# Patient Record
Sex: MALE | Race: WHITE | NOT HISPANIC OR LATINO | Employment: PART TIME | URBAN - NONMETROPOLITAN AREA
[De-identification: names, ages, dates, MRNs, and addresses within clinical notes are randomized per-mention and may not be internally consistent; named-entity substitution may affect disease eponyms.]

---

## 2017-07-14 ENCOUNTER — OFFICE VISIT (OUTPATIENT)
Dept: UROLOGY | Facility: CLINIC | Age: 32
End: 2017-07-14

## 2017-07-14 VITALS — BODY MASS INDEX: 31.99 KG/M2 | TEMPERATURE: 97.5 F | WEIGHT: 216 LBS | HEIGHT: 69 IN

## 2017-07-14 DIAGNOSIS — Z30.09 VISIT FOR VASECTOMY EVALUATION: Primary | ICD-10-CM

## 2017-07-14 PROCEDURE — 99203 OFFICE O/P NEW LOW 30 MIN: CPT | Performed by: UROLOGY

## 2017-07-14 RX ORDER — ALPRAZOLAM 2 MG/1
2 TABLET ORAL AS NEEDED
Qty: 1 TABLET | Refills: 0 | Status: SHIPPED | OUTPATIENT
Start: 2017-07-14 | End: 2022-08-09

## 2017-07-14 RX ORDER — HYDROCODONE BITARTRATE AND ACETAMINOPHEN 7.5; 325 MG/1; MG/1
1 TABLET ORAL EVERY 4 HOURS PRN
Qty: 16 TABLET | Refills: 0 | Status: SHIPPED | OUTPATIENT
Start: 2017-07-14 | End: 2022-02-17

## 2017-07-14 NOTE — PROGRESS NOTES
Mr. Epps is 32 y.o. male    CHIEF COMPLAINT: I am here to discuss a vasectomy     HPI  The patient has been pondering the option of a vasectomy for>5 months. With regard to context of the decision, he presently has 3 children. He is . Associated/Relevant symptoms/signs include None. He voices no additional questions about birth control options.       The following portions of the patient's history were reviewed and updated as appropriate: allergies, current medications, past family history, past medical history, past social history, past surgical history and problem list.    Review of Systems   Constitutional: Negative for appetite change and fever.   HENT: Negative for hearing loss and sore throat.    Eyes: Negative for pain and redness.   Respiratory: Negative for cough and shortness of breath.    Cardiovascular: Negative for chest pain and leg swelling.   Gastrointestinal: Negative for anal bleeding, nausea and vomiting.   Endocrine: Negative for cold intolerance and heat intolerance.   Genitourinary: Negative for dysuria, flank pain and hematuria.   Musculoskeletal: Negative for joint swelling and myalgias.   Skin: Negative for color change and rash.   Allergic/Immunologic: Negative for immunocompromised state.   Neurological: Negative for dizziness and speech difficulty.   Hematological: Negative for adenopathy. Does not bruise/bleed easily.   Psychiatric/Behavioral: Negative for dysphoric mood and suicidal ideas.         Current Outpatient Prescriptions:   •  ALPRAZolam (XANAX) 2 MG tablet, Take 1 tablet by mouth As Needed for Anxiety for up to 1 dose., Disp: 1 tablet, Rfl: 0  •  HYDROcodone-acetaminophen (NORCO) 7.5-325 MG per tablet, Take 1 tablet by mouth Every 4 (Four) Hours As Needed for Moderate Pain  or Severe Pain  for up to 16 doses., Disp: 16 tablet, Rfl: 0    History reviewed. No pertinent past medical history.    History reviewed. No pertinent surgical history.    Social History  "    Social History   • Marital status: Unknown     Spouse name: N/A   • Number of children: N/A   • Years of education: N/A     Social History Main Topics   • Smoking status: Never Smoker   • Smokeless tobacco: None   • Alcohol use None   • Drug use: None   • Sexual activity: Not Asked     Other Topics Concern   • None     Social History Narrative   • None       Family History   Problem Relation Age of Onset   • No Known Problems Father    • No Known Problems Mother        Temp 97.5 °F (36.4 °C)  Ht 69\" (175.3 cm)  Wt 216 lb (98 kg)  BMI 31.9 kg/m2    Physical Exam   Constitutional: He is oriented to person, place, and time. He appears well-developed and well-nourished.   HENT:   Head: Normocephalic and atraumatic.   Eyes: Conjunctivae are normal.   Neck: Normal range of motion. Neck supple.   Cardiovascular: Normal rate and regular rhythm.    Pulmonary/Chest: Effort normal. No respiratory distress. He exhibits no tenderness.   Abdominal: Soft. He exhibits no distension. There is no tenderness. Hernia confirmed negative in the right inguinal area and confirmed negative in the left inguinal area.   Genitourinary: Testes normal and penis normal. Right testis shows no mass, no swelling and no tenderness. Left testis shows no mass, no swelling and no tenderness. No phimosis, paraphimosis or penile tenderness.   Genitourinary Comments: The epididymis is palpably normal bilaterally without mass.  The vas deferens is also palpable bilaterally in the usual location and appears accessible for vasectomy.   Musculoskeletal: Normal range of motion. He exhibits no edema or deformity.   Neurological: He is alert and oriented to person, place, and time.   Skin: Skin is warm and dry.   Psychiatric: He has a normal mood and affect. His behavior is normal.   Vitals reviewed.        No results found for this or any previous visit.    Imaging Results (last 7 days)     ** No results found for the last 168 hours. **    "       Assessment and Plan  Diagnoses and all orders for this visit:    Visit for vasectomy evaluation  -     ALPRAZolam (XANAX) 2 MG tablet; Take 1 tablet by mouth As Needed for Anxiety for up to 1 dose.  -     HYDROcodone-acetaminophen (NORCO) 7.5-325 MG per tablet; Take 1 tablet by mouth Every 4 (Four) Hours As Needed for Moderate Pain  or Severe Pain  for up to 16 doses.  -     Vasectomy; Future    The patient desires vasectomy.  Risks of this procedure are discussed.  We discussed that it does take up to 6 months for a patient to clear the proximal sperm through the process of ejaculation.  It is explained that postoperative specimens are essential before consideration of birth control cessation.  Risks of bleeding, infection, sperm granuloma, testicular pain that can become prolonged such as post vasectomy neuralgia, recanalization with resumption of fertility status, testicular atrophy are included in the discussion.  The patient is made aware of other birth control options including permanent sterilization procedures for females.  It is also explained the vasectomy does not reduce the risk of sexually transmitted disease.  Discussion of the use of preprocedural benzodiazepine and postoperative opiate narcotic relief is also undertaken.  Brief addiction assessment concluded.  The patient has consented to the procedure.      Bo Lara MD  07/17/17  7:13 AM      Cc: Dr. Alexandre

## 2017-08-03 ENCOUNTER — TELEPHONE (OUTPATIENT)
Dept: UROLOGY | Facility: CLINIC | Age: 32
End: 2017-08-03

## 2017-08-03 NOTE — TELEPHONE ENCOUNTER
PT CALLED BACK I SPOKE WITH HIM ABOUT APT. I REMINDED HIM TO SHAVE, BRING  & MEDS. PT VERBALIZED UNDERSTANDING.

## 2017-08-04 ENCOUNTER — OFFICE VISIT (OUTPATIENT)
Dept: UROLOGY | Facility: CLINIC | Age: 32
End: 2017-08-04

## 2017-08-04 DIAGNOSIS — Z30.2 ENCOUNTER FOR VASECTOMY: Primary | ICD-10-CM

## 2017-08-04 PROCEDURE — 55250 REMOVAL OF SPERM DUCT(S): CPT | Performed by: UROLOGY

## 2017-08-04 NOTE — PATIENT INSTRUCTIONS
Following vasectomy you should bring in 2 specimens to confirm that there is no evidence of sperm before stopping birth control.  I would recommend that you bring these in at 8 weeks and then another at 12 weeks.  Sexual activity without birth control can lead to pregnancy following vasectomy until confirmation has been made my semen analysis.

## 2018-06-26 DIAGNOSIS — Z30.2 ENCOUNTER FOR VASECTOMY: Primary | ICD-10-CM

## 2018-06-29 ENCOUNTER — TELEPHONE (OUTPATIENT)
Dept: UROLOGY | Facility: CLINIC | Age: 33
End: 2018-06-29

## 2018-06-29 ENCOUNTER — LAB (OUTPATIENT)
Dept: LAB | Facility: HOSPITAL | Age: 33
End: 2018-06-29
Attending: UROLOGY

## 2018-06-29 DIAGNOSIS — Z30.2 ENCOUNTER FOR VASECTOMY: ICD-10-CM

## 2018-06-29 LAB — SPERM - POST VASECTOMY: NORMAL

## 2018-06-29 PROCEDURE — 89321 SEMEN ANAL SPERM DETECTION: CPT | Performed by: UROLOGY

## 2018-06-29 NOTE — TELEPHONE ENCOUNTER
Patient called for results of semen analysis. I read the report to him which states no semen found in specimen. Instructed him to bring in one more sample in approx 2-3 weeks. He voiced understanding.

## 2019-11-19 ENCOUNTER — HOSPITAL ENCOUNTER (OUTPATIENT)
Age: 34
Setting detail: OUTPATIENT SURGERY
Discharge: HOME | End: 2019-11-19
Attending: ORTHOPAEDIC SURGERY | Admitting: ORTHOPAEDIC SURGERY
Payer: OTHER GOVERNMENT

## 2019-11-19 ENCOUNTER — ANESTHESIA (OUTPATIENT)
Dept: OPERATING ROOM | Age: 34
End: 2019-11-19
Payer: OTHER GOVERNMENT

## 2019-11-19 ENCOUNTER — ANESTHESIA EVENT (OUTPATIENT)
Dept: OPERATING ROOM | Age: 34
End: 2019-11-19
Payer: OTHER GOVERNMENT

## 2019-11-19 VITALS
TEMPERATURE: 97.4 F | RESPIRATION RATE: 18 BRPM | DIASTOLIC BLOOD PRESSURE: 73 MMHG | HEART RATE: 59 BPM | WEIGHT: 195 LBS | SYSTOLIC BLOOD PRESSURE: 114 MMHG | BODY MASS INDEX: 28.88 KG/M2 | HEIGHT: 69 IN | OXYGEN SATURATION: 97 %

## 2019-11-19 VITALS
DIASTOLIC BLOOD PRESSURE: 43 MMHG | TEMPERATURE: 97.8 F | SYSTOLIC BLOOD PRESSURE: 89 MMHG | RESPIRATION RATE: 18 BRPM | OXYGEN SATURATION: 97 %

## 2019-11-19 DIAGNOSIS — S61.209D EXTENSOR TENDON LACERATION OF FINGER WITH OPEN WOUND, SUBSEQUENT ENCOUNTER: Primary | ICD-10-CM

## 2019-11-19 DIAGNOSIS — S56.429D EXTENSOR TENDON LACERATION OF FINGER WITH OPEN WOUND, SUBSEQUENT ENCOUNTER: Primary | ICD-10-CM

## 2019-11-19 PROCEDURE — 3700000001 HC ADD 15 MINUTES (ANESTHESIA): Performed by: ORTHOPAEDIC SURGERY

## 2019-11-19 PROCEDURE — 6360000002 HC RX W HCPCS: Performed by: ANESTHESIOLOGY

## 2019-11-19 PROCEDURE — 7100000001 HC PACU RECOVERY - ADDTL 15 MIN: Performed by: ORTHOPAEDIC SURGERY

## 2019-11-19 PROCEDURE — 3600000002 HC SURGERY LEVEL 2 BASE: Performed by: ORTHOPAEDIC SURGERY

## 2019-11-19 PROCEDURE — 6360000002 HC RX W HCPCS

## 2019-11-19 PROCEDURE — 7100000000 HC PACU RECOVERY - FIRST 15 MIN: Performed by: ORTHOPAEDIC SURGERY

## 2019-11-19 PROCEDURE — 3600000012 HC SURGERY LEVEL 2 ADDTL 15MIN: Performed by: ORTHOPAEDIC SURGERY

## 2019-11-19 PROCEDURE — 2580000003 HC RX 258: Performed by: ORTHOPAEDIC SURGERY

## 2019-11-19 PROCEDURE — 6360000002 HC RX W HCPCS: Performed by: ORTHOPAEDIC SURGERY

## 2019-11-19 PROCEDURE — 7100000010 HC PHASE II RECOVERY - FIRST 15 MIN: Performed by: ORTHOPAEDIC SURGERY

## 2019-11-19 PROCEDURE — 6370000000 HC RX 637 (ALT 250 FOR IP): Performed by: ANESTHESIOLOGY

## 2019-11-19 PROCEDURE — 3700000000 HC ANESTHESIA ATTENDED CARE: Performed by: ORTHOPAEDIC SURGERY

## 2019-11-19 PROCEDURE — 2709999900 HC NON-CHARGEABLE SUPPLY: Performed by: ORTHOPAEDIC SURGERY

## 2019-11-19 PROCEDURE — 2500000003 HC RX 250 WO HCPCS

## 2019-11-19 PROCEDURE — 76942 ECHO GUIDE FOR BIOPSY: CPT

## 2019-11-19 RX ORDER — SODIUM CHLORIDE 0.9 % (FLUSH) 0.9 %
10 SYRINGE (ML) INJECTION EVERY 12 HOURS SCHEDULED
Status: DISCONTINUED | OUTPATIENT
Start: 2019-11-19 | End: 2019-11-19 | Stop reason: HOSPADM

## 2019-11-19 RX ORDER — ENALAPRILAT 2.5 MG/2ML
1.25 INJECTION INTRAVENOUS
Status: DISCONTINUED | OUTPATIENT
Start: 2019-11-19 | End: 2019-11-19 | Stop reason: HOSPADM

## 2019-11-19 RX ORDER — FENTANYL CITRATE 50 UG/ML
50 INJECTION, SOLUTION INTRAMUSCULAR; INTRAVENOUS
Status: DISCONTINUED | OUTPATIENT
Start: 2019-11-19 | End: 2019-11-19 | Stop reason: HOSPADM

## 2019-11-19 RX ORDER — LIDOCAINE HYDROCHLORIDE 10 MG/ML
INJECTION, SOLUTION EPIDURAL; INFILTRATION; INTRACAUDAL; PERINEURAL PRN
Status: DISCONTINUED | OUTPATIENT
Start: 2019-11-19 | End: 2019-11-19 | Stop reason: SDUPTHER

## 2019-11-19 RX ORDER — FENTANYL CITRATE 50 UG/ML
25 INJECTION, SOLUTION INTRAMUSCULAR; INTRAVENOUS
Status: DISCONTINUED | OUTPATIENT
Start: 2019-11-19 | End: 2019-11-19 | Stop reason: HOSPADM

## 2019-11-19 RX ORDER — PROMETHAZINE HYDROCHLORIDE 25 MG/ML
6.25 INJECTION, SOLUTION INTRAMUSCULAR; INTRAVENOUS
Status: DISCONTINUED | OUTPATIENT
Start: 2019-11-19 | End: 2019-11-19 | Stop reason: HOSPADM

## 2019-11-19 RX ORDER — HYDRALAZINE HYDROCHLORIDE 20 MG/ML
5 INJECTION INTRAMUSCULAR; INTRAVENOUS EVERY 10 MIN PRN
Status: DISCONTINUED | OUTPATIENT
Start: 2019-11-19 | End: 2019-11-19 | Stop reason: HOSPADM

## 2019-11-19 RX ORDER — ROPIVACAINE HYDROCHLORIDE 5 MG/ML
INJECTION, SOLUTION EPIDURAL; INFILTRATION; PERINEURAL PRN
Status: DISCONTINUED | OUTPATIENT
Start: 2019-11-19 | End: 2019-11-19 | Stop reason: SDUPTHER

## 2019-11-19 RX ORDER — MIDAZOLAM HYDROCHLORIDE 1 MG/ML
INJECTION INTRAMUSCULAR; INTRAVENOUS PRN
Status: DISCONTINUED | OUTPATIENT
Start: 2019-11-19 | End: 2019-11-19 | Stop reason: SDUPTHER

## 2019-11-19 RX ORDER — SODIUM CHLORIDE, SODIUM LACTATE, POTASSIUM CHLORIDE, CALCIUM CHLORIDE 600; 310; 30; 20 MG/100ML; MG/100ML; MG/100ML; MG/100ML
INJECTION, SOLUTION INTRAVENOUS CONTINUOUS
Status: DISCONTINUED | OUTPATIENT
Start: 2019-11-19 | End: 2019-11-19 | Stop reason: HOSPADM

## 2019-11-19 RX ORDER — SODIUM CHLORIDE 0.9 % (FLUSH) 0.9 %
10 SYRINGE (ML) INJECTION PRN
Status: DISCONTINUED | OUTPATIENT
Start: 2019-11-19 | End: 2019-11-19 | Stop reason: HOSPADM

## 2019-11-19 RX ORDER — OXYCODONE HYDROCHLORIDE AND ACETAMINOPHEN 5; 325 MG/1; MG/1
1 TABLET ORAL EVERY 6 HOURS PRN
Qty: 10 TABLET | Refills: 0 | Status: SHIPPED | OUTPATIENT
Start: 2019-11-19 | End: 2019-11-22

## 2019-11-19 RX ORDER — LABETALOL 20 MG/4 ML (5 MG/ML) INTRAVENOUS SYRINGE
5 EVERY 10 MIN PRN
Status: DISCONTINUED | OUTPATIENT
Start: 2019-11-19 | End: 2019-11-19 | Stop reason: HOSPADM

## 2019-11-19 RX ORDER — LIDOCAINE HYDROCHLORIDE 10 MG/ML
1 INJECTION, SOLUTION EPIDURAL; INFILTRATION; INTRACAUDAL; PERINEURAL
Status: DISCONTINUED | OUTPATIENT
Start: 2019-11-19 | End: 2019-11-19 | Stop reason: HOSPADM

## 2019-11-19 RX ORDER — ONDANSETRON 2 MG/ML
INJECTION INTRAMUSCULAR; INTRAVENOUS PRN
Status: DISCONTINUED | OUTPATIENT
Start: 2019-11-19 | End: 2019-11-19 | Stop reason: SDUPTHER

## 2019-11-19 RX ORDER — KETOROLAC TROMETHAMINE 30 MG/ML
INJECTION, SOLUTION INTRAMUSCULAR; INTRAVENOUS PRN
Status: DISCONTINUED | OUTPATIENT
Start: 2019-11-19 | End: 2019-11-19 | Stop reason: SDUPTHER

## 2019-11-19 RX ORDER — DIPHENHYDRAMINE HYDROCHLORIDE 50 MG/ML
12.5 INJECTION INTRAMUSCULAR; INTRAVENOUS
Status: DISCONTINUED | OUTPATIENT
Start: 2019-11-19 | End: 2019-11-19 | Stop reason: HOSPADM

## 2019-11-19 RX ORDER — MORPHINE SULFATE 4 MG/ML
4 INJECTION, SOLUTION INTRAMUSCULAR; INTRAVENOUS EVERY 5 MIN PRN
Status: DISCONTINUED | OUTPATIENT
Start: 2019-11-19 | End: 2019-11-19 | Stop reason: HOSPADM

## 2019-11-19 RX ORDER — MIDAZOLAM HYDROCHLORIDE 1 MG/ML
2 INJECTION INTRAMUSCULAR; INTRAVENOUS
Status: COMPLETED | OUTPATIENT
Start: 2019-11-19 | End: 2019-11-19

## 2019-11-19 RX ORDER — FENTANYL CITRATE 50 UG/ML
INJECTION, SOLUTION INTRAMUSCULAR; INTRAVENOUS PRN
Status: DISCONTINUED | OUTPATIENT
Start: 2019-11-19 | End: 2019-11-19 | Stop reason: SDUPTHER

## 2019-11-19 RX ORDER — MORPHINE SULFATE 4 MG/ML
2 INJECTION, SOLUTION INTRAMUSCULAR; INTRAVENOUS EVERY 5 MIN PRN
Status: DISCONTINUED | OUTPATIENT
Start: 2019-11-19 | End: 2019-11-19 | Stop reason: HOSPADM

## 2019-11-19 RX ORDER — SCOLOPAMINE TRANSDERMAL SYSTEM 1 MG/1
1 PATCH, EXTENDED RELEASE TRANSDERMAL
Status: DISCONTINUED | OUTPATIENT
Start: 2019-11-19 | End: 2019-11-19 | Stop reason: HOSPADM

## 2019-11-19 RX ORDER — PROPOFOL 10 MG/ML
INJECTION, EMULSION INTRAVENOUS PRN
Status: DISCONTINUED | OUTPATIENT
Start: 2019-11-19 | End: 2019-11-19 | Stop reason: SDUPTHER

## 2019-11-19 RX ORDER — MEPERIDINE HYDROCHLORIDE 50 MG/ML
12.5 INJECTION INTRAMUSCULAR; INTRAVENOUS; SUBCUTANEOUS EVERY 5 MIN PRN
Status: DISCONTINUED | OUTPATIENT
Start: 2019-11-19 | End: 2019-11-19 | Stop reason: HOSPADM

## 2019-11-19 RX ORDER — METOCLOPRAMIDE HYDROCHLORIDE 5 MG/ML
10 INJECTION INTRAMUSCULAR; INTRAVENOUS
Status: DISCONTINUED | OUTPATIENT
Start: 2019-11-19 | End: 2019-11-19 | Stop reason: HOSPADM

## 2019-11-19 RX ADMIN — KETOROLAC TROMETHAMINE 30 MG: 30 INJECTION, SOLUTION INTRAMUSCULAR; INTRAVENOUS at 17:00

## 2019-11-19 RX ADMIN — SODIUM CHLORIDE, SODIUM LACTATE, POTASSIUM CHLORIDE, AND CALCIUM CHLORIDE: 600; 310; 30; 20 INJECTION, SOLUTION INTRAVENOUS at 16:06

## 2019-11-19 RX ADMIN — FENTANYL CITRATE 50 MCG: 50 INJECTION INTRAMUSCULAR; INTRAVENOUS at 16:17

## 2019-11-19 RX ADMIN — MIDAZOLAM 2 MG: 1 INJECTION INTRAMUSCULAR; INTRAVENOUS at 16:06

## 2019-11-19 RX ADMIN — SODIUM CHLORIDE, SODIUM LACTATE, POTASSIUM CHLORIDE, AND CALCIUM CHLORIDE: 600; 310; 30; 20 INJECTION, SOLUTION INTRAVENOUS at 16:56

## 2019-11-19 RX ADMIN — MIDAZOLAM 2 MG: 1 INJECTION INTRAMUSCULAR; INTRAVENOUS at 15:47

## 2019-11-19 RX ADMIN — ROPIVACAINE HYDROCHLORIDE 25 ML: 5 INJECTION, SOLUTION EPIDURAL; INFILTRATION; PERINEURAL at 15:53

## 2019-11-19 RX ADMIN — FENTANYL CITRATE 50 MCG: 50 INJECTION INTRAMUSCULAR; INTRAVENOUS at 16:51

## 2019-11-19 RX ADMIN — ONDANSETRON HYDROCHLORIDE 4 MG: 2 INJECTION, SOLUTION INTRAMUSCULAR; INTRAVENOUS at 17:00

## 2019-11-19 RX ADMIN — PROPOFOL 60 MG: 10 INJECTION, EMULSION INTRAVENOUS at 16:12

## 2019-11-19 RX ADMIN — Medication 2 G: at 16:15

## 2019-11-19 RX ADMIN — LIDOCAINE HYDROCHLORIDE 50 MG: 10 INJECTION, SOLUTION EPIDURAL; INFILTRATION; INTRACAUDAL; PERINEURAL at 16:09

## 2019-11-19 RX ADMIN — PROPOFOL 200 MG: 10 INJECTION, EMULSION INTRAVENOUS at 16:09

## 2019-11-19 RX ADMIN — PROPOFOL 60 MG: 10 INJECTION, EMULSION INTRAVENOUS at 16:11

## 2019-11-19 ASSESSMENT — PAIN SCALES - GENERAL
PAINLEVEL_OUTOF10: 0

## 2019-11-19 ASSESSMENT — LIFESTYLE VARIABLES: SMOKING_STATUS: 0

## 2022-02-28 ENCOUNTER — APPOINTMENT (OUTPATIENT)
Dept: GENERAL RADIOLOGY | Facility: HOSPITAL | Age: 37
End: 2022-02-28

## 2022-02-28 PROCEDURE — 71046 X-RAY EXAM CHEST 2 VIEWS: CPT

## 2022-06-09 NOTE — PROGRESS NOTES
Chief Complaint  Shortness of Breath    Subjective    History of Present Illness     Shane Epps presents to [facility] for:    Management of shortness of breath, chest tightness and wheezing.  [facility]  He is a never smoker.  He reports a history of exercise-induced asthma as a young child.  He reports for the last few years he has had seasonal cough, chest tightness and congestion.  Typically around harvest season.  He typically responds well to taking daily Mucinex throughout the season.  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  He has chickens at home.  He has cats at home.  He is uncertain what exposure he may have had while serving in the .  He denies any family history of lung disease.  Diagnosed with COVID in January.  Since then he feels his symptoms have been a bit more difficult to manage. X-ray imaging in February showing mild interstitial changes.  He sought treatment at urgent care.  He was given Advair 500 and oral steroids.  He believes those were helpful but he is uncertain.  He has been off of those for quite some time.  He has been using an albuterol inhaler on a daily basis mostly for activity induced complaints.  He also reports using it in the evenings on days when he is completed yard work due to increased wheezing and tightness.       Prior to Admission medications    Medication Sig Start Date End Date Taking? Authorizing Provider   ALPRAZolam (XANAX) 2 MG tablet Take 1 tablet by mouth As Needed for Anxiety for up to 1 dose. 7/14/17   Bo Lara MD   amphetamine-dextroamphetamine (ADDERALL) 15 MG tablet  5/3/22   ProviderEdilberto MD   amphetamine-dextroamphetamine XR (ADDERALL XR) 30 MG 24 hr capsule  5/3/22   ProviderEdilberto MD   diazePAM (VALIUM) 2 MG tablet  2/1/22   Emergency, Nurse Yanique, RN   fluticasone-salmeterol (ADVAIR) 500-50 MCG/DOSE DISKUS  "Inhale 1 puff 2 (Two) Times a Day. 2/28/22   Portia Waite APRN   Glucosamine-Chondroit-Vit C-Mn (Glucosamine Chondr 1500 Complx) capsule Take 1 tablet by mouth Daily.    Emergency, Nurse Yanique, RN       Social History     Socioeconomic History   • Marital status:    Tobacco Use   • Smoking status: Never Smoker   Vaping Use   • Vaping Use: Never used   Substance and Sexual Activity   • Alcohol use: Never   • Drug use: Never   • Sexual activity: Yes       Objective   Vital Signs:   /80   Pulse 87   Ht 175.3 cm (69\")   Wt 98.9 kg (218 lb)   SpO2 98% Comment: ra  BMI 32.19 kg/m²     Physical Exam  Constitutional:       Appearance: He is overweight.      Interventions: Face mask in place.   Cardiovascular:      Rate and Rhythm: Normal rate and regular rhythm.      Heart sounds: No murmur heard.  Pulmonary:      Effort: Pulmonary effort is normal.      Breath sounds: Normal breath sounds.   Musculoskeletal:      Right lower leg: No edema.      Left lower leg: No edema.   Neurological:      Mental Status: He is alert and oriented to person, place, and time.        Result Review :      My interpretation of the PFT: none for this visit    No results found for this or any previous visit.    XR Chest 2 View (02/28/2022 11:41)    My interpretation of imaging:  Mild interstitial changes    My interpretation of labs: none for this visit      Assessment and Plan     Diagnoses and all orders for this visit:    1. Shortness of breath (Primary)  Comments:  Trial of Dulera 200 x 1 month.  Continue to use albuterol as needed or prior to exercise.  PFTs when he returns    2. Class 1 obesity due to excess calories without serious comorbidity with body mass index (BMI) of 32.0 to 32.9 in adult  Comments:  Education material provided after visit summary about BMI    3. Mild persistent asthma without complication  Comments:  History of exercise-induced asthma likely triggered by allergens related to outdoor work and " farming.  Also triggered by viral illness, COVID      If asthma is refractory to inhaler therapy consider adding Singulair versus allergy work-up.    Body mass index is 32.19 kg/m².    Tianna Coulter, APRN  6/22/2022  10:08 CDT    Follow Up   Return in about 4 weeks (around 7/20/2022) for FVL with diffusion.    Patient was given instructions and counseling regarding his condition or for health maintenance advice. Please see specific information pulled into the AVS if appropriate.

## 2022-06-22 ENCOUNTER — OFFICE VISIT (OUTPATIENT)
Dept: PULMONOLOGY | Facility: CLINIC | Age: 37
End: 2022-06-22

## 2022-06-22 VITALS
OXYGEN SATURATION: 98 % | BODY MASS INDEX: 32.29 KG/M2 | SYSTOLIC BLOOD PRESSURE: 132 MMHG | HEIGHT: 69 IN | DIASTOLIC BLOOD PRESSURE: 80 MMHG | WEIGHT: 218 LBS | HEART RATE: 87 BPM

## 2022-06-22 DIAGNOSIS — R06.02 SHORTNESS OF BREATH: Primary | Chronic | ICD-10-CM

## 2022-06-22 DIAGNOSIS — J45.30 MILD PERSISTENT ASTHMA WITHOUT COMPLICATION: Chronic | ICD-10-CM

## 2022-06-22 DIAGNOSIS — E66.09 CLASS 1 OBESITY DUE TO EXCESS CALORIES WITHOUT SERIOUS COMORBIDITY WITH BODY MASS INDEX (BMI) OF 32.0 TO 32.9 IN ADULT: ICD-10-CM

## 2022-06-22 PROBLEM — E66.811 CLASS 1 OBESITY DUE TO EXCESS CALORIES IN ADULT: Chronic | Status: ACTIVE | Noted: 2022-06-22

## 2022-06-22 PROBLEM — E66.811 CLASS 1 OBESITY DUE TO EXCESS CALORIES IN ADULT: Status: ACTIVE | Noted: 2022-06-22

## 2022-06-22 PROCEDURE — 99214 OFFICE O/P EST MOD 30 MIN: CPT | Performed by: NURSE PRACTITIONER

## 2022-06-27 PROBLEM — J45.20 MILD INTERMITTENT ASTHMA WITHOUT COMPLICATION: Chronic | Status: ACTIVE | Noted: 2022-06-27

## 2022-06-27 PROBLEM — E66.811 CLASS 1 OBESITY DUE TO EXCESS CALORIES WITHOUT SERIOUS COMORBIDITY WITH BODY MASS INDEX (BMI) OF 30.0 TO 30.9 IN ADULT: Status: ACTIVE | Noted: 2022-06-27

## 2022-06-27 PROBLEM — J45.20 MILD INTERMITTENT ASTHMA WITHOUT COMPLICATION: Status: ACTIVE | Noted: 2022-06-27

## 2022-06-27 PROBLEM — E66.811 CLASS 1 OBESITY DUE TO EXCESS CALORIES WITHOUT SERIOUS COMORBIDITY WITH BODY MASS INDEX (BMI) OF 30.0 TO 30.9 IN ADULT: Status: RESOLVED | Noted: 2022-06-27 | Resolved: 2022-06-27

## 2022-06-27 PROBLEM — E66.09 CLASS 1 OBESITY DUE TO EXCESS CALORIES WITHOUT SERIOUS COMORBIDITY WITH BODY MASS INDEX (BMI) OF 30.0 TO 30.9 IN ADULT: Status: RESOLVED | Noted: 2022-06-27 | Resolved: 2022-06-27

## 2022-06-27 PROBLEM — E66.09 CLASS 1 OBESITY DUE TO EXCESS CALORIES WITHOUT SERIOUS COMORBIDITY WITH BODY MASS INDEX (BMI) OF 30.0 TO 30.9 IN ADULT: Status: ACTIVE | Noted: 2022-06-27

## 2022-07-26 PROBLEM — Z91.09 ENVIRONMENTAL ALLERGIES: Status: ACTIVE | Noted: 2022-07-26

## 2022-07-26 PROBLEM — Z91.09 ENVIRONMENTAL ALLERGIES: Chronic | Status: ACTIVE | Noted: 2022-07-26

## 2022-08-05 ENCOUNTER — LAB (OUTPATIENT)
Dept: LAB | Facility: HOSPITAL | Age: 37
End: 2022-08-05

## 2022-08-05 DIAGNOSIS — Z20.822 ENCOUNTER FOR PREOPERATIVE SCREENING LABORATORY TESTING FOR COVID-19 VIRUS: ICD-10-CM

## 2022-08-05 DIAGNOSIS — Z01.812 ENCOUNTER FOR PREOPERATIVE SCREENING LABORATORY TESTING FOR COVID-19 VIRUS: ICD-10-CM

## 2022-08-05 LAB — SARS-COV-2 ORF1AB RESP QL NAA+PROBE: NOT DETECTED

## 2022-08-05 PROCEDURE — U0004 COV-19 TEST NON-CDC HGH THRU: HCPCS

## 2022-08-05 PROCEDURE — C9803 HOPD COVID-19 SPEC COLLECT: HCPCS

## 2022-08-09 ENCOUNTER — OFFICE VISIT (OUTPATIENT)
Dept: PULMONOLOGY | Facility: CLINIC | Age: 37
End: 2022-08-09

## 2022-08-09 ENCOUNTER — PROCEDURE VISIT (OUTPATIENT)
Dept: PULMONOLOGY | Facility: CLINIC | Age: 37
End: 2022-08-09

## 2022-08-09 VITALS
OXYGEN SATURATION: 98 % | BODY MASS INDEX: 33.74 KG/M2 | DIASTOLIC BLOOD PRESSURE: 74 MMHG | HEIGHT: 67 IN | WEIGHT: 215 LBS | HEART RATE: 89 BPM | SYSTOLIC BLOOD PRESSURE: 138 MMHG

## 2022-08-09 DIAGNOSIS — J45.998 POST-VIRAL REACTIVE AIRWAY DISEASE: ICD-10-CM

## 2022-08-09 DIAGNOSIS — J45.20 MILD INTERMITTENT ASTHMA WITHOUT COMPLICATION: Primary | ICD-10-CM

## 2022-08-09 DIAGNOSIS — Z91.09 ENVIRONMENTAL ALLERGIES: Chronic | ICD-10-CM

## 2022-08-09 DIAGNOSIS — J45.20 MILD INTERMITTENT ASTHMA WITHOUT COMPLICATION: Primary | Chronic | ICD-10-CM

## 2022-08-09 PROCEDURE — 99214 OFFICE O/P EST MOD 30 MIN: CPT | Performed by: NURSE PRACTITIONER

## 2022-08-09 PROCEDURE — 94729 DIFFUSING CAPACITY: CPT | Performed by: NURSE PRACTITIONER

## 2022-08-09 PROCEDURE — 94010 BREATHING CAPACITY TEST: CPT | Performed by: NURSE PRACTITIONER

## 2022-08-09 RX ORDER — ALBUTEROL SULFATE 90 UG/1
2 AEROSOL, METERED RESPIRATORY (INHALATION) EVERY 4 HOURS PRN
Qty: 18 G | Refills: 1 | Status: SHIPPED | OUTPATIENT
Start: 2022-08-09 | End: 2022-11-07

## 2022-08-09 RX ORDER — ALBUTEROL SULFATE 90 UG/1
2 AEROSOL, METERED RESPIRATORY (INHALATION) EVERY 4 HOURS PRN
Qty: 54 G | Refills: 0 | Status: SHIPPED | OUTPATIENT
Start: 2022-08-09 | End: 2022-11-07

## 2022-08-09 RX ORDER — FLUTICASONE PROPIONATE AND SALMETEROL XINAFOATE 230; 21 UG/1; UG/1
2 AEROSOL, METERED RESPIRATORY (INHALATION)
Qty: 3 EACH | Refills: 3 | Status: SHIPPED | OUTPATIENT
Start: 2022-08-09 | End: 2022-08-12 | Stop reason: ALTCHOICE

## 2022-08-09 NOTE — PROCEDURES
Pulmonary Function Test  Performed by: Alta White, RRT  Authorized by: Tianna Coulter APRN      Pre Drug % Predicted    FVC: 96%   FEV1: 90%   FEF 25-75%: 75%   FEV1/FVC: 76%   DLCO: 121%   D/VAsb: 112%    Interpretation   Spirometry There is reduced midflow suggesting small airway/airflow obstruction.   Diffusion Capacity  The patient's diffusion capacity is normal.  Diffusion capacity is normal when corrected for alveolar volume.

## 2022-08-11 ENCOUNTER — TELEPHONE (OUTPATIENT)
Dept: PULMONOLOGY | Facility: CLINIC | Age: 37
End: 2022-08-11

## 2022-08-11 NOTE — TELEPHONE ENCOUNTER
Breo [facility] and the alternative is Dulera.  Do you want to [facility] into the [facility]?

## 2022-08-12 RX ORDER — MOMETASONE FUROATE AND FORMOTEROL FUMARATE DIHYDRATE 200; 5 UG/1; UG/1
2 AEROSOL RESPIRATORY (INHALATION)
Qty: 13 G | Refills: 11 | Status: SHIPPED | OUTPATIENT
Start: 2022-08-12

## 2022-08-12 NOTE — TELEPHONE ENCOUNTER
Rx Refill Note  Requested Prescriptions     Pending Prescriptions Disp Refills   • mometasone-formoterol (Dulera) 200-5 MCG/ACT inhaler 13 g 11     Sig: Inhale 2 puffs 2 (Two) Times a Day.      Last office visit with prescribing clinician: 8/9/2022      Next office visit with prescribing clinician: 2/14/2023            Alta Colón CMA  08/12/22, 09:24 CDT

## 2023-02-14 ENCOUNTER — OFFICE VISIT (OUTPATIENT)
Dept: PULMONOLOGY | Facility: CLINIC | Age: 38
End: 2023-02-14
Payer: OTHER GOVERNMENT

## 2023-02-14 VITALS
HEART RATE: 90 BPM | OXYGEN SATURATION: 98 % | WEIGHT: 226 LBS | SYSTOLIC BLOOD PRESSURE: 124 MMHG | HEIGHT: 67 IN | BODY MASS INDEX: 35.47 KG/M2 | DIASTOLIC BLOOD PRESSURE: 74 MMHG

## 2023-02-14 DIAGNOSIS — E66.01 CLASS 2 SEVERE OBESITY DUE TO EXCESS CALORIES WITH SERIOUS COMORBIDITY AND BODY MASS INDEX (BMI) OF 35.0 TO 35.9 IN ADULT: Chronic | ICD-10-CM

## 2023-02-14 DIAGNOSIS — J45.40 MODERATE PERSISTENT ASTHMA WITHOUT COMPLICATION: Primary | Chronic | ICD-10-CM

## 2023-02-14 DIAGNOSIS — Z91.09 ENVIRONMENTAL ALLERGIES: Chronic | ICD-10-CM

## 2023-02-14 PROCEDURE — 99214 OFFICE O/P EST MOD 30 MIN: CPT | Performed by: NURSE PRACTITIONER

## 2023-02-14 RX ORDER — ALBUTEROL SULFATE 90 UG/1
2 AEROSOL, METERED RESPIRATORY (INHALATION) EVERY 4 HOURS PRN
COMMUNITY

## 2023-02-14 RX ORDER — FLUTICASONE FUROATE, UMECLIDINIUM BROMIDE AND VILANTEROL TRIFENATATE 200; 62.5; 25 UG/1; UG/1; UG/1
1 POWDER RESPIRATORY (INHALATION) DAILY
Qty: 1 EACH | Refills: 0 | COMMUNITY
Start: 2023-02-14 | End: 2023-02-28

## 2023-07-25 PROBLEM — J45.20 MILD INTERMITTENT ASTHMA WITHOUT COMPLICATION: Chronic | Status: RESOLVED | Noted: 2022-06-27 | Resolved: 2023-07-25

## 2023-09-12 PROBLEM — J45.20 MILD INTERMITTENT ASTHMA WITHOUT COMPLICATION: Chronic | Status: ACTIVE | Noted: 2023-09-12

## 2023-09-12 PROBLEM — J45.20 MILD INTERMITTENT ASTHMA WITHOUT COMPLICATION: Status: ACTIVE | Noted: 2023-09-12

## 2023-09-12 NOTE — PROGRESS NOTES
Chief Complaint  Asthma    Subjective    History of Present Illness {CC  Problem List  Visit Diagnosis   Encounters  Notes  Medications  Labs  Result Review Imaging  Media: 23}    Shane Epps presents to [facility] for:    History of Present Illness  Management of asthma. 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 He is a never smoker. He is overweight. History of exercise-induced asthma as a child. He responds well to Mucinex.  He takes 1200 mg every morning.  He usually forgets to take his second dose in the afternoon.  It does help mobilize mucus.  He has benefited from Advair 500, Breo 100 and Dulera 200 in the past. Most recently on Dulera 200 and doing well.  He indicates over the last few months it has not been helping as well.  He believes he needs to go back on Advair or Breo.  He does utilize his albuterol before exercise some.  He is also had some occasional reflux.  Nothing specific triggers it.  It is very sporadic.  We discussed adding Singulair versus allergy work-up versus triple therapy if his condition did not remain stable.         Prior to Admission medications    Medication Sig Start Date End Date Taking? Authorizing Provider   albuterol sulfate  (90 Base) MCG/ACT inhaler Inhale 2 puffs Every 4 (Four) Hours As Needed for Wheezing.    Provider, Edilberto, MD   amphetamine-dextroamphetamine (ADDERALL) 15 MG tablet  5/3/22   Provider, MD Edilberto   amphetamine-dextroamphetamine XR (ADDERALL XR) 30 MG 24 hr capsule  5/3/22   Provider, MD Edilberto   diazePAM (VALIUM) 2 MG tablet PRN 2/1/22   Emergency, Nurse Yanique, RN   mometasone-formoterol (Dulera) 200-5 MCG/ACT inhaler Inhale 2 puffs 2 (Two) Times a Day. 8/12/22   Tianna Coulter APRN       Social History     Socioeconomic History    Marital status:    Tobacco Use    Smoking status: Former     Packs/day: 0.25     Years:  "2.00     Additional pack years: 0.00     Total pack years: 0.50     Types: Cigarettes     Passive exposure: Past    Tobacco comments:     Socially as a teenager   Vaping Use    Vaping Use: Never used   Substance and Sexual Activity    Alcohol use: Never    Drug use: Never    Sexual activity: Yes       Objective   Vital Signs:   /78   Pulse 82   Ht 170.2 cm (67\")   Wt 101 kg (222 lb)   SpO2 95% Comment: RA  BMI 34.77 kg/mý     Physical Exam  Constitutional:       Appearance: He is obese.   Cardiovascular:      Rate and Rhythm: Normal rate and regular rhythm.      Heart sounds: No murmur heard.  Pulmonary:      Effort: Pulmonary effort is normal.      Breath sounds: Normal breath sounds.   Musculoskeletal:      Right lower leg: No edema.      Left lower leg: No edema.   Neurological:      Mental Status: He is alert and oriented to person, place, and time.        Result Review :    PFT Values          8/9/2022    15:30 10/12/2023    14:00   Pre Drug PFT Results   FVC 96 89   FEV1 90 84   FEF 25-75% 75 70   FEV1/FVC 76 77   Other Tests PFT Results   DLCO 121    D/VAsb 112      Results for orders placed in visit on 10/12/23    Spirometry    Narrative  Spirometry    Performed by: Alta White, RRT  Authorized by: Tianna Coulter APRN  Pre Drug % Predicted  FVC: 89%  FEV1: 84%  FEF 25-75%: 70%  FEV1/FVC: 77%    Interpretation  Spirometry  Spirometry shows normal results. There is reduced midflow suggesting small airway/airflow obstruction.  Review of FVL curve  Patient's effort is normal.      Results for orders placed in visit on 07/20/22    Pulmonary Function Test    Narrative  Pulmonary Function Test  Performed by: Alta White, RRT  Authorized by: Tianna Cuolter, CLAUDIA    Pre Drug % Predicted  FVC: 96%  FEV1: 90%  FEF 25-75%: 75%  FEV1/FVC: 76%  DLCO: 121%  D/VAsb: 112%    Interpretation  Spirometry There is reduced midflow suggesting small airway/airflow obstruction.  Diffusion " Capacity  The patient's diffusion capacity is normal.  Diffusion capacity is normal when corrected for alveolar volume.      My interpretation of imaging:  none    My interpretation of labs: none      Assessment and Plan {CC Problem List  Visit Diagnosis  ROS  Review (Popup)  Health Maintenance  Quality  BestPractice  Medications  SmartSets  SnapShot Encounters  Media : 23}    Diagnoses and all orders for this visit:    1. Moderate persistent asthma without complication (Primary)  Comments:  Stop Dulera.  Start Advair 500.  Start Singulair.  Use albuterol as needed.  If Singulair is helpful, call for prescription  Orders:  -     Spirometry  -     montelukast (SINGULAIR) 10 MG tablet; Take 1 tablet by mouth Every Night for 30 days.  Dispense: 30 tablet; Refill: 0  -     Fluticasone-Salmeterol (ADVAIR/WIXELA) 500-50 MCG/ACT DISKUS; Inhale 1 puff 2 (Two) Times a Day for 30 days.  Dispense: 60 each; Refill: 11    2. Class 1 obesity due to excess calories without serious comorbidity with body mass index (BMI) of 34.0 to 34.9 in adult  Comments:  Contributes to underlying issues.  Education material provided    3. Environmental allergies  Comments:  Continue Mucinex full dose.  Add to Singulair.  If helpful, call for prescription    4. Laryngopharyngeal reflux  Comments:  New symptom.  Sporadic.  He will call if symptoms become more regular.  Could trial over-the-counter Pepcid        Body mass index is 34.77 kg/mý. Educational material provided after visit summary about elevated BMI      CLAUDIA Zee  10/12/2023  14:37 CDT    Follow Up   Return in about 1 year (around 10/12/2024) for FVL.    Patient was given instructions and counseling regarding his condition or for health maintenance advice. Please see specific information pulled into the AVS if appropriate.

## 2023-10-12 ENCOUNTER — OFFICE VISIT (OUTPATIENT)
Dept: PULMONOLOGY | Facility: CLINIC | Age: 38
End: 2023-10-12
Payer: OTHER GOVERNMENT

## 2023-10-12 VITALS
WEIGHT: 222 LBS | OXYGEN SATURATION: 95 % | SYSTOLIC BLOOD PRESSURE: 142 MMHG | HEIGHT: 67 IN | BODY MASS INDEX: 34.84 KG/M2 | HEART RATE: 82 BPM | DIASTOLIC BLOOD PRESSURE: 78 MMHG

## 2023-10-12 DIAGNOSIS — J45.40 MODERATE PERSISTENT ASTHMA WITHOUT COMPLICATION: Primary | ICD-10-CM

## 2023-10-12 DIAGNOSIS — Z91.09 ENVIRONMENTAL ALLERGIES: Chronic | ICD-10-CM

## 2023-10-12 DIAGNOSIS — K21.9 LARYNGOPHARYNGEAL REFLUX: ICD-10-CM

## 2023-10-12 DIAGNOSIS — E66.09 CLASS 1 OBESITY DUE TO EXCESS CALORIES WITHOUT SERIOUS COMORBIDITY WITH BODY MASS INDEX (BMI) OF 34.0 TO 34.9 IN ADULT: Chronic | ICD-10-CM

## 2023-10-12 RX ORDER — FLUTICASONE PROPIONATE AND SALMETEROL 500; 50 UG/1; UG/1
1 POWDER RESPIRATORY (INHALATION)
Qty: 60 EACH | Refills: 11 | Status: SHIPPED | OUTPATIENT
Start: 2023-10-12 | End: 2023-11-11

## 2023-10-12 RX ORDER — MONTELUKAST SODIUM 10 MG/1
10 TABLET ORAL NIGHTLY
Qty: 30 TABLET | Refills: 0 | Status: SHIPPED | OUTPATIENT
Start: 2023-10-12 | End: 2023-11-11

## 2023-10-12 NOTE — PROCEDURES
Spirometry    Performed by: Alta White, RRT  Authorized by: Tianna Coulter APRN     Pre Drug % Predicted    FVC: 89%   FEV1: 84%   FEF 25-75%: 70%   FEV1/FVC: 77%    Interpretation   Spirometry   Spirometry shows normal results. There is reduced midflow suggesting small airway/airflow obstruction.   Review of FVL curve   Patient's effort is normal.

## 2024-05-22 ENCOUNTER — TRANSCRIBE ORDERS (OUTPATIENT)
Dept: ADMINISTRATIVE | Facility: HOSPITAL | Age: 39
End: 2024-05-22
Payer: OTHER GOVERNMENT

## 2024-05-22 DIAGNOSIS — R51.9 NONINTRACTABLE HEADACHE, UNSPECIFIED CHRONICITY PATTERN, UNSPECIFIED HEADACHE TYPE: Primary | ICD-10-CM

## 2024-06-20 ENCOUNTER — HOSPITAL ENCOUNTER (OUTPATIENT)
Dept: MRI IMAGING | Facility: HOSPITAL | Age: 39
Discharge: HOME | End: 2024-06-20
Admitting: NURSE PRACTITIONER
Payer: OTHER GOVERNMENT

## 2024-06-20 DIAGNOSIS — R51.9 NONINTRACTABLE HEADACHE, UNSPECIFIED CHRONICITY PATTERN, UNSPECIFIED HEADACHE TYPE: ICD-10-CM

## 2024-06-20 PROCEDURE — 70551 MRI BRAIN STEM W/O DYE: CPT

## 2024-08-26 ENCOUNTER — OFFICE VISIT (OUTPATIENT)
Dept: NEUROSURGERY | Facility: CLINIC | Age: 39
End: 2024-08-26
Payer: OTHER GOVERNMENT

## 2024-08-26 VITALS — HEIGHT: 69 IN | WEIGHT: 213.8 LBS | BODY MASS INDEX: 31.67 KG/M2

## 2024-08-26 DIAGNOSIS — Z72.0 TOBACCO CHEW USE: ICD-10-CM

## 2024-08-26 DIAGNOSIS — G93.0 ARACHNOID CYST: ICD-10-CM

## 2024-08-26 DIAGNOSIS — E66.09 CLASS 1 OBESITY DUE TO EXCESS CALORIES WITHOUT SERIOUS COMORBIDITY WITH BODY MASS INDEX (BMI) OF 31.0 TO 31.9 IN ADULT: ICD-10-CM

## 2024-08-26 DIAGNOSIS — R51.9 FREQUENT HEADACHES: Primary | ICD-10-CM

## 2024-08-26 PROCEDURE — 99204 OFFICE O/P NEW MOD 45 MIN: CPT | Performed by: PHYSICIAN ASSISTANT
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
## 2024-08-27 NOTE — PROGRESS NOTES
Chief Complaint  Asthma    Subjective    History of Present Illness {CC  Problem List  Visit Diagnosis   Encounters  Notes  Medications  Labs  Result Review Imaging  Media: 23}    Shane Epps presents to [facility] for:    History of Present Illness  Management of asthma. 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     He is a never smoker. He is overweight. History of exercise-induced asthma as a child. He responds well to Mucinex.  It does help mobilize mucus.      He is on Advair 500.  He is doing well on this.  He has also benefited from Breo 100.  He did not benefit from Dulera 200.     Previously on Singulair for allergies.  He is no longer taking that.  He did not believe it helped.       Prior to Admission medications    Medication Sig Start Date End Date Taking? Authorizing Provider   albuterol sulfate  (90 Base) MCG/ACT inhaler Inhale 2 puffs Every 4 (Four) Hours As Needed for Wheezing.    Provider, MD Edilberto   amphetamine-dextroamphetamine (ADDERALL) 15 MG tablet  5/3/22   Provider, MD Edilberto   amphetamine-dextroamphetamine XR (ADDERALL XR) 30 MG 24 hr capsule  5/3/22   Provider, MD Edilberto   diazePAM (VALIUM) 2 MG tablet PRN 2/1/22   Emergency, Nurse Epic, RN   Fluticasone-Salmeterol (ADVAIR/WIXELA) 500-50 MCG/ACT DISKUS Inhale 1 puff 2 (Two) Times a Day for 30 days. 10/12/23 11/11/23  Tianna Coulter APRN       Social History     Socioeconomic History    Marital status:    Tobacco Use    Smoking status: Former     Current packs/day: 0.25     Average packs/day: 0.3 packs/day for 2.0 years (0.5 ttl pk-yrs)     Types: Cigarettes     Passive exposure: Past    Smokeless tobacco: Current    Tobacco comments:     Socially as a teenager   Vaping Use    Vaping status: Never Used   Substance and Sexual Activity    Alcohol use: Never    Drug use: Never    Sexual activity: Yes  "      Objective   Vital Signs:   /76   Pulse 86   Ht 170.2 cm (67\")   Wt 97.5 kg (215 lb)   SpO2 97% Comment: RA  BMI 33.67 kg/m²     Physical Exam  Constitutional:       Appearance: He is obese.   Cardiovascular:      Rate and Rhythm: Normal rate and regular rhythm.      Heart sounds: No murmur heard.  Pulmonary:      Effort: Pulmonary effort is normal.      Breath sounds: Normal breath sounds.   Musculoskeletal:      Right lower leg: No edema.      Left lower leg: No edema.   Neurological:      Mental Status: He is alert and oriented to person, place, and time.        Result Review :    PFT Values          10/12/2023    14:00 10/14/2024    09:00   Pre Drug PFT Results   FVC 89 113   FEV1 84 108   FEF 25-75% 70 97   FEV1/FVC 77 78.72     Results for orders placed in visit on 10/14/24    Spirometry    Narrative  Spirometry    Performed by: Wale Dobbs CMA  Authorized by: Tianna Coulter APRN  Pre Drug % Predicted  FVC: 113%  FEV1: 108%  FEF 25-75%: 97%  FEV1/FVC: 78.72%    Interpretation  Spirometry  Spirometry shows normal results.  Review of FVL curve  Patient's effort is normal.      Results for orders placed in visit on 10/12/23    Spirometry    Narrative  Spirometry    Performed by: Alta White RRT  Authorized by: Tianna Coulter APRN  Pre Drug % Predicted  FVC: 89%  FEV1: 84%  FEF 25-75%: 70%  FEV1/FVC: 77%    Interpretation  Spirometry  Spirometry shows normal results. There is reduced midflow suggesting small airway/airflow obstruction.  Review of FVL curve  Patient's effort is normal.      Results for orders placed in visit on 07/20/22    Pulmonary Function Test    Narrative  Pulmonary Function Test  Performed by: Alta White, ZAKIA  Authorized by: Tianna Coulter APRN    Pre Drug % Predicted  FVC: 96%  FEV1: 90%  FEF 25-75%: 75%  FEV1/FVC: 76%  DLCO: 121%  D/VAsb: 112%    Interpretation  Spirometry There is reduced midflow suggesting small airway/airflow " obstruction.  Diffusion Capacity  The patient's diffusion capacity is normal.  Diffusion capacity is normal when corrected for alveolar volume.    My interpretation of imaging:  none    My interpretation of labs: none      Assessment and Plan {CC Problem List  Visit Diagnosis  ROS  Review (Popup)  Health Maintenance  Quality  BestPractice  Medications  SmartSets  SnapShot Encounters  Media : 23}    Diagnoses and all orders for this visit:    1. Moderate persistent asthma without complication (Primary)  Comments:  Continue Advair 500.  Use albuterol as needed.  PFTs normal.  Repeat annually  Orders:  -     Spirometry  -     Fluticasone-Salmeterol (ADVAIR/WIXELA) 500-50 MCG/ACT DISKUS; Inhale 1 puff 2 (Two) Times a Day for 90 days.  Dispense: 3 each; Refill: 3    2. Environmental allergies  Comments:  Contributes to asthma exacerbations intermittently.  Did not benefit from Singulair.  Consider Allegra or azelastine if worse        Body mass index is 33.67 kg/m².    Tianna Coulter, APRN  10/14/2024  09:33 CDT    Follow Up   Return in about 1 year (around 10/14/2025) for FVL.    Patient was given instructions and counseling regarding his condition or for health maintenance advice. Please see specific information pulled into the AVS if appropriate.

## 2024-08-28 DIAGNOSIS — G93.0 ARACHNOID CYST: Primary | ICD-10-CM

## 2024-09-27 ENCOUNTER — HOSPITAL ENCOUNTER (OUTPATIENT)
Dept: MRI IMAGING | Facility: HOSPITAL | Age: 39
Discharge: HOME | End: 2024-09-27
Admitting: PHYSICIAN ASSISTANT
Payer: OTHER GOVERNMENT

## 2024-09-27 DIAGNOSIS — G93.0 ARACHNOID CYST: ICD-10-CM

## 2024-09-27 LAB — CREAT BLDA-MCNC: 1.2 MG/DL (ref 0.6–1.3)

## 2024-09-27 PROCEDURE — A9577 INJ MULTIHANCE: HCPCS | Performed by: PHYSICIAN ASSISTANT

## 2024-09-27 PROCEDURE — 70553 MRI BRAIN STEM W/O & W/DYE: CPT

## 2024-09-27 PROCEDURE — 0 GADOBENATE DIMEGLUMINE 529 MG/ML SOLUTION: Performed by: PHYSICIAN ASSISTANT

## 2024-09-27 PROCEDURE — 82565 ASSAY OF CREATININE: CPT

## 2024-09-27 RX ADMIN — GADOBENATE DIMEGLUMINE 20 ML: 529 INJECTION, SOLUTION INTRAVENOUS at 08:11

## 2024-10-14 ENCOUNTER — OFFICE VISIT (OUTPATIENT)
Dept: PULMONOLOGY | Facility: CLINIC | Age: 39
End: 2024-10-14
Payer: OTHER GOVERNMENT

## 2024-10-14 VITALS
DIASTOLIC BLOOD PRESSURE: 76 MMHG | OXYGEN SATURATION: 97 % | BODY MASS INDEX: 33.74 KG/M2 | WEIGHT: 215 LBS | HEIGHT: 67 IN | SYSTOLIC BLOOD PRESSURE: 124 MMHG | HEART RATE: 86 BPM

## 2024-10-14 DIAGNOSIS — J45.40 MODERATE PERSISTENT ASTHMA WITHOUT COMPLICATION: Primary | ICD-10-CM

## 2024-10-14 DIAGNOSIS — Z91.09 ENVIRONMENTAL ALLERGIES: Chronic | ICD-10-CM

## 2024-10-14 PROCEDURE — 99213 OFFICE O/P EST LOW 20 MIN: CPT | Performed by: NURSE PRACTITIONER

## 2024-10-14 PROCEDURE — 94375 RESPIRATORY FLOW VOLUME LOOP: CPT | Performed by: NURSE PRACTITIONER

## 2024-10-14 RX ORDER — FLUTICASONE PROPIONATE AND SALMETEROL 500; 50 UG/1; UG/1
1 POWDER RESPIRATORY (INHALATION)
Qty: 3 EACH | Refills: 3 | Status: SHIPPED | OUTPATIENT
Start: 2024-10-14 | End: 2025-01-12

## 2024-10-14 NOTE — PROCEDURES
Spirometry    Performed by: Wale Dobbs CMA  Authorized by: Tianna Coulter APRN     Pre Drug % Predicted    FVC: 113%   FEV1: 108%   FEF 25-75%: 97%   FEV1/FVC: 78.72%    Interpretation   Spirometry   Spirometry shows normal results.   Review of FVL curve   Patient's effort is normal.

## 2024-11-25 ENCOUNTER — OFFICE VISIT (OUTPATIENT)
Dept: NEUROSURGERY | Facility: CLINIC | Age: 39
End: 2024-11-25
Payer: OTHER GOVERNMENT

## 2024-11-25 VITALS — WEIGHT: 209 LBS | HEIGHT: 67 IN | BODY MASS INDEX: 32.8 KG/M2

## 2024-11-25 DIAGNOSIS — E66.811 CLASS 1 OBESITY DUE TO EXCESS CALORIES WITHOUT SERIOUS COMORBIDITY WITH BODY MASS INDEX (BMI) OF 32.0 TO 32.9 IN ADULT: ICD-10-CM

## 2024-11-25 DIAGNOSIS — E66.09 CLASS 1 OBESITY DUE TO EXCESS CALORIES WITHOUT SERIOUS COMORBIDITY WITH BODY MASS INDEX (BMI) OF 32.0 TO 32.9 IN ADULT: ICD-10-CM

## 2024-11-25 DIAGNOSIS — F17.220 CHEWING TOBACCO DEPENDENCE: Primary | ICD-10-CM

## 2024-11-25 NOTE — PROGRESS NOTES
"    Chief complaint:   Chief Complaint   Patient presents with    Follow-up     3 month follow up Arachnoid Cyst, discuss MRI        Subjective     HPI: I had a chance to see Shane today in follow-up and to review his new MRI of the brain.  He does have a very small arachnoid cyst which I do not think is likely to be causing his headache syndromes although some of his description of the headache sounds like it could be occipital neuralgia.  He does already have an appointment with neurology for evaluation.    Review of Systems      Objective      Vital Signs  Ht 170.2 cm (67\")   Wt 94.8 kg (209 lb)   BMI 32.73 kg/m²     Physical Exam  Psychiatric:         Speech: Speech normal.         Neurological Exam  Mental Status  Awake, alert and oriented to person, place and time. Oriented to person, place and time. Speech is normal. Language is fluent with no aphasia. Attention and concentration are normal. Fund of knowledge is appropriate for level of education.    Cranial Nerves  Cranial nerves grossly intact    No facial asymmetry.    Motor    Moving all extremities.    Coordination    No involuntary movements.       Imaging review: Exam: MRI BRAIN W WO CONTRAST- 9/27/2024 6:30 AM     History: ARACHNOID CYST; G93.0-Cerebral cysts     Technique: Multisequence, multiplanar MRI of the brain was performed  prior to and after the administration of intravenous gadolinium  contrast.     Contrast: 20 MultiHance.     Comparison: 6/20/2024     Findings:      Stable 1.8 cm arachnoid cyst along the base of the left frontal lobe  with associated mild localized mass effect and thin rim of gliosis.     Ventricles are otherwise normal in size. Major vascular flow voids are  preserved. No abnormal susceptibility artifact. No abnormal diffusion  restriction. No abnormal intracranial enhancement. Sella/parasellar  structures are grossly unremarkable. No tonsillar ectopia. Orbits are  grossly unremarkable. Chronic mild diffuse paranasal " sinus mucosal  thickening. Moderate bilateral mastoid effusions. No suspicious  calvarial or extra cranial soft tissue abnormality.     IMPRESSION:  Impression:     Stable 1.8 cm arachnoid cyst along the left anterior skull base with  mild localized mass effect in a thin rim of gliosis.     Moderate bilateral mastoid effusions.           Assessment/Plan:   Headache syndrome  Possible occipital neuralgia  Arachnoid cyst    Given the fact that this arachnoid cyst is very small it is unlikely that this is the cause of the headaches.  I do think I would like to see Shane rivas in 6 months however and we may order a new MRI just to make sure this is not increasing in size.  He will give us a call in the meantime if there are any new issues and he will follow-up with neurology.  I look forward to seeing him at his next visit.    Patient is a nonsmoker  The patient's Body mass index is 32.73 kg/m².. BMI is above normal parameters. Recommendations include: continue with current weight loss program    Diagnoses and all orders for this visit:    1. Chewing tobacco dependence (Primary)    2. Class 1 obesity due to excess calories without serious comorbidity with body mass index (BMI) of 32.0 to 32.9 in adult        I discussed the patients findings and my recommendations with patient  Bar Caceres DO  11/25/24  09:10 CST

## 2025-01-20 ENCOUNTER — OFFICE VISIT (OUTPATIENT)
Dept: NEUROLOGY | Facility: CLINIC | Age: 40
End: 2025-01-20
Payer: OTHER GOVERNMENT

## 2025-01-20 VITALS
WEIGHT: 200 LBS | HEIGHT: 67 IN | DIASTOLIC BLOOD PRESSURE: 90 MMHG | OXYGEN SATURATION: 98 % | SYSTOLIC BLOOD PRESSURE: 140 MMHG | HEART RATE: 75 BPM | BODY MASS INDEX: 31.39 KG/M2

## 2025-01-20 DIAGNOSIS — G43.719 INTRACTABLE CHRONIC MIGRAINE WITHOUT AURA AND WITHOUT STATUS MIGRAINOSUS: Primary | ICD-10-CM

## 2025-01-20 PROCEDURE — 99204 OFFICE O/P NEW MOD 45 MIN: CPT | Performed by: PSYCHIATRY & NEUROLOGY

## 2025-01-20 RX ORDER — RIZATRIPTAN BENZOATE 10 MG/1
10 TABLET ORAL ONCE AS NEEDED
Qty: 9 TABLET | Refills: 2 | Status: SHIPPED | OUTPATIENT
Start: 2025-01-20 | End: 2025-04-20

## 2025-01-20 RX ORDER — TOPIRAMATE 50 MG/1
TABLET, FILM COATED ORAL
Qty: 60 TABLET | Refills: 2 | Status: SHIPPED | OUTPATIENT
Start: 2025-01-20

## 2025-01-20 NOTE — PROGRESS NOTES
Subjective        Shane Epps presents to [facility] Neu[facility]    History of Present Illness  The patient is a 39-year-old male with a history of asthma, ADHD, and anxiety, referred for headaches.    He has been experiencing headaches since 2005 or 2006, following an initial head injury. Despite trying 3 to 4 different medications, none have provided relief. He has not been on any prescription medication for his headaches since then. Initially, he experienced 4 to 5 headaches per week, which he managed without medication. However, towards the end of last year, the intensity of his headaches increased. He now experiences severe headaches that can be triggered by various factors, including changes in position or elevation. These headaches can last for several hours, even persisting through sleep. The frequency of these severe headaches varies, with periods of 4 to 5 episodes in a week, followed by 2 to 3 weeks without any. He reports no associated nausea, sensitivity to light, sound, or smell, and no vision changes. The headaches typically start from the front left side and then spread out. He also experiences neck stiffness during these episodes. He has had 2 MRIs and has seen neurosurgery twice. He was told about a cyst in his brain but was informed that it is not causing his headaches. He consumes 16 ounces of coffee in the morning and 20 to 40 ounces of Diet Mountain Dew throughout the day. He does not believe his headaches are caffeine-related as he has tried abstaining from caffeine without any improvement. He does not wake up with headaches. He takes a couple of Tylenol every other week or 3 weeks when the headaches become more annoying. He also takes 500 mg Excedrin as needed and uses a cold mask with pressure over his head to help him sleep.    He has been feeling like he has a cotton ball in his right ear for the past 1.5 to 2 weeks. He has not had his hearing checked  "recently.              Current Outpatient Medications:     albuterol sulfate  (90 Base) MCG/ACT inhaler, Inhale 2 puffs Every 4 (Four) Hours As Needed for Wheezing., Disp: , Rfl:     amphetamine-dextroamphetamine (ADDERALL) 15 MG tablet, Take 1 tablet by mouth Daily., Disp: , Rfl:     amphetamine-dextroamphetamine XR (ADDERALL XR) 30 MG 24 hr capsule, Take 1 capsule by mouth Every Morning, Disp: , Rfl:     diazePAM (VALIUM) 2 MG tablet, Take 1 tablet by mouth Every 6 (Six) Hours As Needed. PRN, Disp: , Rfl:     Fluticasone-Salmeterol (ADVAIR/WIXELA) 500-50 MCG/ACT DISKUS, Inhale 1 puff 2 (Two) Times a Day for 30 days., Disp: 60 each, Rfl: 11       Objective   Vital Signs:   /90   Pulse 75   Ht 170.2 cm (67\")   Wt 90.7 kg (200 lb)   SpO2 98%   BMI 31.32 kg/m²     Physical Exam  Constitutional:       General: He is awake.   Eyes:      Extraocular Movements: Extraocular movements intact.   Neurological:      Mental Status: He is alert.   Psychiatric:         Speech: Speech normal.          Neurological Exam  Mental Status  Awake and alert. Speech is normal. Language is fluent with no aphasia.    Cranial Nerves  CN III, IV, VI: Extraocular movements intact bilaterally.  CN VII: Full and symmetric facial movement.    Gait  Casual gait is normal including stance, stride, and arm swing.      Result Review :            Results                 Assessment and Plan     Assessment & Plan  39-year-old male with Asthma, ADHD, anxiety referred for headaches.  MRI brain done showed a stable 1.8 cm arachnoid cyst along the left anterior skull base.  He is following with neurosurgery for this.  They are only recommending monitoring.  He has many questions relating to this.  I discussed that for his headaches we can trial medications.  He is somewhat reluctant to do this primarily because he still has many questions relating to the cyst.  I did ask him to make another appoint with neurosurgery so that he can get his " questions more satisfactorily answered.  Discussed medication options.    Plan:    1.  Start topiramate 25 mg twice daily for a week then 50 mg twice daily thereafter.  2.  Maxalt 10 mg at onset of migraine.  3.  Follow-up 6 to 8 weeks.          Follow Up   No follow-ups on file.  Patient was given instructions and counseling regarding his condition or for health maintenance advice. Please see specific information pulled into the AVS if appropriate.         Patient or patient representative verbalized consent for the use of Ambient Listening during the visit with  Jolly Arthur MD for chart documentation. 1/28/2025  14:38 CST

## 2025-01-20 NOTE — PATIENT INSTRUCTIONS
Try rizatriptan 10 mg, one pill at onset of severe headache. Can repeat once in 2 hours.   Preventative: Topiramate 50 mg tabs, 1/2 pill twice a day for a week. Then one pill twice a day after.

## 2025-01-23 ENCOUNTER — PATIENT ROUNDING (BHMG ONLY) (OUTPATIENT)
Dept: NEUROLOGY | Facility: CLINIC | Age: 40
End: 2025-01-23
Payer: OTHER GOVERNMENT

## 2025-01-23 NOTE — PROGRESS NOTES
January 23, 2025    Hello, may I speak with Shane Epps?    My name is kevan      I am  with [facility]UR[facility]  [address]  [address]  [place] [address] [phone].    Before we get started may I verify your date of birth? 1985    I am calling to officially welcome you to our practice and ask about your recent visit. Is this a good time to talk? yes    Tell me about your visit with us. What things went well?  great       We're always looking for ways to make our patients' experiences even better. Do you have recommendations on ways we may improve?  no    Overall were you satisfied with your first visit to our practice? yes       I appreciate you taking the time to speak with me today. Is there anything else I can do for you? no      Thank you, and have a great day.

## 2025-01-27 ENCOUNTER — TELEPHONE (OUTPATIENT)
Dept: NEUROLOGY | Facility: CLINIC | Age: 40
End: 2025-01-27

## 2025-01-27 NOTE — TELEPHONE ENCOUNTER
[facility] received a fax that requires your attention. The document has been indexed to the patient’s chart for your review.      Reason for sending: MED REC RQST FOR DOS 1/20/25    Documents Description: MED REC RQST [NEURO]_[facility]_1.27.25     Name of Sender: [facility]    Date Indexed: 01/27/25    Notes (if needed):

## 2025-03-24 ENCOUNTER — TELEPHONE (OUTPATIENT)
Dept: NEUROLOGY | Facility: CLINIC | Age: 40
End: 2025-03-24

## 2025-04-20 DIAGNOSIS — G43.719 INTRACTABLE CHRONIC MIGRAINE WITHOUT AURA AND WITHOUT STATUS MIGRAINOSUS: ICD-10-CM

## 2025-04-21 RX ORDER — TOPIRAMATE 50 MG/1
50 TABLET, FILM COATED ORAL 2 TIMES DAILY
Qty: 60 TABLET | Refills: 0 | Status: SHIPPED | OUTPATIENT
Start: 2025-04-21

## 2025-05-16 ENCOUNTER — TRANSCRIBE ORDERS (OUTPATIENT)
Dept: ADMINISTRATIVE | Facility: HOSPITAL | Age: 40
End: 2025-05-16
Payer: OTHER GOVERNMENT

## 2025-05-16 DIAGNOSIS — M25.512 LEFT SHOULDER PAIN, UNSPECIFIED CHRONICITY: Primary | ICD-10-CM

## 2025-05-19 NOTE — PROGRESS NOTES
"  Subjective        Shane Epps presents to [facility]    History of Present Illness      38 yo male here for f/u of migraine. Started topamax last visit. Has not had any significant improvement. Still having 4-5 per week.  However, has not had a severe headache so hasn't used the maxalt.         Current Outpatient Medications:     amphetamine-dextroamphetamine (ADDERALL) 15 MG tablet, Take 1 tablet by mouth Daily., Disp: , Rfl:     amphetamine-dextroamphetamine XR (ADDERALL XR) 30 MG 24 hr capsule, Take 1 capsule by mouth Every Morning, Disp: , Rfl:     diazePAM (VALIUM) 2 MG tablet, Take 1 tablet by mouth Every 6 (Six) Hours As Needed. PRN, Disp: , Rfl:     rizatriptan (Maxalt) 10 MG tablet, Take 1 tablet by mouth 1 (One) Time As Needed for Migraine for up to 90 days. May repeat in 2 hours if needed, Disp: 9 tablet, Rfl: 2    topiramate (TOPAMAX) 50 MG tablet, Take 1 tablet by mouth 2 (Two) Times a Day., Disp: 60 tablet, Rfl: 0       Objective   Vital Signs:   /80   Pulse 83   Ht 170.2 cm (67\")   Wt 88 kg (194 lb)   SpO2 98%   BMI 30.38 kg/m²     Physical Exam  Constitutional:       General: He is awake.   Eyes:      Extraocular Movements: Extraocular movements intact.   Neurological:      Mental Status: He is alert.   Psychiatric:         Speech: Speech normal.        Neurological Exam  Mental Status  Awake and alert. Speech is normal. Language is fluent with no aphasia.    Cranial Nerves  CN III, IV, VI: Extraocular movements intact bilaterally.  CN VII: Full and symmetric facial movement.    Gait  Casual gait is normal including stance, stride, and arm swing.      Result Review :            Results                 Assessment and Plan     Assessment & Plan  39-year-old male with Asthma, ADHD, anxiety referred for headaches.  MRI brain done showed a stable 1.8 cm arachnoid cyst along the left anterior skull base.  He is following with neurosurgery for this.  They are " recommending monitoring.  No benefit with topiramate. Propranolol contraindicated because of his asthma.     Plan:    1. Start nortriptyline 25 mg qhs.  2. Stop topiramate.  3. Can still use Maxalt at onset.   4. F/u 2-3 months.          Follow Up   No follow-ups on file.  Patient was given instructions and counseling regarding his condition or for health maintenance advice. Please see specific information pulled into the AVS if appropriate.

## 2025-05-20 ENCOUNTER — OFFICE VISIT (OUTPATIENT)
Dept: NEUROLOGY | Facility: CLINIC | Age: 40
End: 2025-05-20
Payer: OTHER GOVERNMENT

## 2025-05-20 VITALS
WEIGHT: 194 LBS | BODY MASS INDEX: 30.45 KG/M2 | HEART RATE: 83 BPM | OXYGEN SATURATION: 98 % | DIASTOLIC BLOOD PRESSURE: 80 MMHG | SYSTOLIC BLOOD PRESSURE: 138 MMHG | HEIGHT: 67 IN

## 2025-05-20 DIAGNOSIS — G43.719 INTRACTABLE CHRONIC MIGRAINE WITHOUT AURA AND WITHOUT STATUS MIGRAINOSUS: Primary | ICD-10-CM

## 2025-05-20 PROCEDURE — 99214 OFFICE O/P EST MOD 30 MIN: CPT | Performed by: PSYCHIATRY & NEUROLOGY

## 2025-05-20 RX ORDER — NORTRIPTYLINE HYDROCHLORIDE 25 MG/1
25 CAPSULE ORAL NIGHTLY
Qty: 30 CAPSULE | Refills: 5 | Status: SHIPPED | OUTPATIENT
Start: 2025-05-20 | End: 2025-11-16

## 2025-05-28 ENCOUNTER — OFFICE VISIT (OUTPATIENT)
Dept: NEUROSURGERY | Facility: CLINIC | Age: 40
End: 2025-05-28
Payer: OTHER GOVERNMENT

## 2025-05-28 VITALS — HEIGHT: 67 IN | BODY MASS INDEX: 30.45 KG/M2 | WEIGHT: 194 LBS

## 2025-05-28 DIAGNOSIS — R51.9 FREQUENT HEADACHES: ICD-10-CM

## 2025-05-28 DIAGNOSIS — E66.09 CLASS 1 OBESITY DUE TO EXCESS CALORIES WITHOUT SERIOUS COMORBIDITY WITH BODY MASS INDEX (BMI) OF 30.0 TO 30.9 IN ADULT: ICD-10-CM

## 2025-05-28 DIAGNOSIS — G93.0 ARACHNOID CYST: Primary | ICD-10-CM

## 2025-05-28 DIAGNOSIS — M54.2 CERVICALGIA: ICD-10-CM

## 2025-05-28 DIAGNOSIS — E66.811 CLASS 1 OBESITY DUE TO EXCESS CALORIES WITHOUT SERIOUS COMORBIDITY WITH BODY MASS INDEX (BMI) OF 30.0 TO 30.9 IN ADULT: ICD-10-CM

## 2025-05-28 NOTE — PROGRESS NOTES
"Still having headaches, currently on a new medication from neurology.  Will get a new MRI of the brain and we will also get an MRI of the cervical spine.    Chief complaint:   Chief Complaint   Patient presents with    Follow-up     6 month follow up Arachnoid Cyst        Subjective     HPI: The chance to see Shane today in follow-up and to review his MRI of the brain.  He is still having significant headaches although he is on a new medication with neurology and we will see how the he does with this medication.  He does of course have an arachnoid cyst and it is difficult to say for sure whether this is contributing to the headaches however I would like to get a new MRI of the brain to make sure this thing is not growing.  We will order the new MRI and he will follow-up with me once that is complete.  I look forward to seeing him at his next visit.    Review of Systems      Objective      Vital Signs  Ht 170.2 cm (67\")   Wt 88 kg (194 lb)   BMI 30.38 kg/m²     Physical Exam    Neurological Exam     Imaging review: No new imaging        Assessment/Plan:   Left frontal arachnoid cyst with minimal mass effect    Will get a new MRI of the brain to make sure that this is not enlarging and we will also see how he is doing as far as his new medication with neurology is going.  I look forward to seeing him at his next visit.    Patient is a nonsmoker  The patient's Body mass index is 30.38 kg/m².. BMI is above normal parameters. Recommendations include: continue with current weight loss program    Diagnoses and all orders for this visit:    1. Arachnoid cyst (Primary)    2. Frequent headaches    3. Class 1 obesity due to excess calories without serious comorbidity with body mass index (BMI) of 30.0 to 30.9 in adult    4. Cervicalgia        I discussed the patients findings and my recommendations with patient  Bar CARL DO [place]  05/28/25  10:13 CDT          "

## 2025-05-29 ENCOUNTER — HOSPITAL ENCOUNTER (OUTPATIENT)
Dept: MRI IMAGING | Facility: HOSPITAL | Age: 40
Discharge: HOME | End: 2025-05-29
Payer: OTHER GOVERNMENT

## 2025-05-29 DIAGNOSIS — M25.512 LEFT SHOULDER PAIN, UNSPECIFIED CHRONICITY: ICD-10-CM

## 2025-05-29 PROCEDURE — 73221 MRI JOINT UPR EXTREM W/O DYE: CPT

## 2025-06-12 ENCOUNTER — OFFICE VISIT (OUTPATIENT)
Age: 40
End: 2025-06-12
Payer: OTHER GOVERNMENT

## 2025-06-12 VITALS — HEIGHT: 69 IN | WEIGHT: 194 LBS | BODY MASS INDEX: 28.73 KG/M2

## 2025-06-12 DIAGNOSIS — S46.812A TRAUMATIC RUPTURE OF SUBSCAPULARIS TENDON OF LEFT SHOULDER: ICD-10-CM

## 2025-06-12 DIAGNOSIS — S43.432A BANKART LESION OF LEFT SHOULDER, INITIAL ENCOUNTER: Primary | ICD-10-CM

## 2025-06-12 DIAGNOSIS — M77.8 SUBSCAPULARIS TENDONITIS OF LEFT SHOULDER: ICD-10-CM

## 2025-06-12 PROCEDURE — 99204 OFFICE O/P NEW MOD 45 MIN: CPT | Performed by: PHYSICIAN ASSISTANT

## 2025-06-12 RX ORDER — RIZATRIPTAN BENZOATE 10 MG/1
10 TABLET ORAL
COMMUNITY
Start: 2025-01-20

## 2025-06-12 RX ORDER — TOPIRAMATE 50 MG/1
50 TABLET, FILM COATED ORAL 2 TIMES DAILY
COMMUNITY
Start: 2025-04-21

## 2025-06-12 RX ORDER — NORTRIPTYLINE HYDROCHLORIDE 25 MG/1
25 CAPSULE ORAL NIGHTLY
COMMUNITY
Start: 2025-05-20 | End: 2025-11-17

## 2025-06-12 ASSESSMENT — ENCOUNTER SYMPTOMS
BACK PAIN: 0
COLOR CHANGE: 0

## 2025-06-12 NOTE — ASSESSMENT & PLAN NOTE
After discussing the patient reviewing imaging with Dr. Mullins, I did express to the patient that surgical intervention with anterior Bankart repair to include the subscapularis partial-thickness repair is recommended.  We did discuss the recovery time for this as well as the risks and benefits including but not limited to the risk for infection, nerve and artery damage, continued pain, continued decreased range of motion, paresthesias, paralysis, loss of limb, loss of life, need for further surgery.  He conveyed his understanding and willingness to proceed.  We will see him back 2 weeks following surgery for clinical recheck.

## 2025-06-12 NOTE — PROGRESS NOTES
patient instructions below.         Electronically signed by WAYNE Sunshine on 6/12/2025 at 10:38 AM

## 2025-06-17 ENCOUNTER — HOSPITAL ENCOUNTER (OUTPATIENT)
Dept: MRI IMAGING | Facility: HOSPITAL | Age: 40
Discharge: HOME | End: 2025-06-17
Payer: OTHER GOVERNMENT

## 2025-06-17 DIAGNOSIS — R51.9 FREQUENT HEADACHES: ICD-10-CM

## 2025-06-17 DIAGNOSIS — M54.2 CERVICALGIA: ICD-10-CM

## 2025-06-17 DIAGNOSIS — G93.0 ARACHNOID CYST: ICD-10-CM

## 2025-06-17 PROCEDURE — A9573 GADOPICLENOL 0.5 MMOL/ML SOLUTION: HCPCS | Performed by: NEUROLOGICAL SURGERY

## 2025-06-17 PROCEDURE — 25510000001 GADOPICLENOL 0.5 MMOL/ML SOLUTION: Performed by: NEUROLOGICAL SURGERY

## 2025-06-17 PROCEDURE — 72141 MRI NECK SPINE W/O DYE: CPT

## 2025-06-17 PROCEDURE — 70553 MRI BRAIN STEM W/O & W/DYE: CPT

## 2025-06-17 RX ADMIN — GADOPICLENOL 9 ML: 485.1 INJECTION INTRAVENOUS at 09:31

## 2025-06-26 ENCOUNTER — TELEPHONE (OUTPATIENT)
Dept: NEUROLOGY | Facility: CLINIC | Age: 40
End: 2025-06-26

## 2025-06-26 NOTE — TELEPHONE ENCOUNTER
The [facility] received a fax that requires your attention. The document has been indexed to the patient’s chart for your review.      Reason for sending: NOTIFICATION OF NEWLY INDEXED RECORDS    Documents Description: NEURO REFERRAL UPDATED: EXP 01/18/26    Name of Sender: [facility]     Date Indexed: 06/26/25    Notes (if needed): FOR REVIEW

## 2025-07-07 ENCOUNTER — PRE-ADMISSION TESTING (OUTPATIENT)
Dept: PREADMISSION TESTING | Facility: HOSPITAL | Age: 40
End: 2025-07-07
Payer: OTHER GOVERNMENT

## 2025-07-07 VITALS
HEART RATE: 82 BPM | WEIGHT: 200.4 LBS | OXYGEN SATURATION: 97 % | SYSTOLIC BLOOD PRESSURE: 125 MMHG | RESPIRATION RATE: 18 BRPM | BODY MASS INDEX: 29.68 KG/M2 | HEIGHT: 69 IN | DIASTOLIC BLOOD PRESSURE: 80 MMHG

## 2025-07-07 LAB
DEPRECATED RDW RBC AUTO: 41.6 FL (ref 37–54)
ERYTHROCYTE [DISTWIDTH] IN BLOOD BY AUTOMATED COUNT: 13 % (ref 12.3–15.4)
HCT VFR BLD AUTO: 47.4 % (ref 37.5–51)
HGB BLD-MCNC: 16 G/DL (ref 13–17.7)
MCH RBC QN AUTO: 29.5 PG (ref 26.6–33)
MCHC RBC AUTO-ENTMCNC: 33.8 G/DL (ref 31.5–35.7)
MCV RBC AUTO: 87.3 FL (ref 79–97)
PLATELET # BLD AUTO: 214 10*3/MM3 (ref 140–450)
PMV BLD AUTO: 10.2 FL (ref 6–12)
RBC # BLD AUTO: 5.43 10*6/MM3 (ref 4.14–5.8)
WBC NRBC COR # BLD AUTO: 7.74 10*3/MM3 (ref 3.4–10.8)

## 2025-07-07 PROCEDURE — 36415 COLL VENOUS BLD VENIPUNCTURE: CPT

## 2025-07-07 PROCEDURE — 85027 COMPLETE CBC AUTOMATED: CPT

## 2025-07-07 RX ORDER — VIT C/HESPERIDIN/BIOFLAVONOIDS 500-100 MG
1 TABLET ORAL DAILY
COMMUNITY

## 2025-07-07 RX ORDER — DIPHENOXYLATE HYDROCHLORIDE AND ATROPINE SULFATE 2.5; .025 MG/1; MG/1
1 TABLET ORAL 2 TIMES DAILY
COMMUNITY

## 2025-07-07 NOTE — DISCHARGE INSTRUCTIONS
Preparing for Surgery  Follow these instructions before the procedure:  Several days or weeks before your procedure  Follow your doctor's instructions      Ask your health care provider about:  Changing or stopping your regular medicines. This is especially important if you are taking diabetes medicines or blood thinners.  Taking medicines such as aspirin and ibuprofen. These medicines can thin your blood. Do not take these medicines unless your health care provider tells you to take them.  Taking over-the-counter medicines, vitamins, herbs, and supplements.    Contact your surgeon if you:  Develop a fever of more than 100.4°F (38°C) or other feelings of illness during the 48 hours before your surgery.  Have symptoms that get worse.  Have questions or concerns about your surgery.  If you are going home the same day of your surgery you will need to arrange for a responsible adult, age 18 years old or older, to drive you home from the hospital and stay with you for 24 hours. Verification of the  will be made prior to any procedure requiring sedation. You may not go home in a taxi or any form of public transportation by yourself.     Day before your procedure    24 hours before your procedure DO NOT drink alcoholic beverages or smoke.  24 hours before your procedure STOP taking Erectile Dysfunction medication (i.e.,Cialis, Viagra)   You may be asked to shower with a germ-killing soap.  Day of your procedure       8 hours before your scheduled arrival time, STOP all food, any dairy products, and full liquids. This includes hard candy, chewing gum or mints. This is extremely important to prevent serious complications.     Up to 2 hours before your scheduled arrival time, you may have clear liquids no cream, powder, or pulp of any kind. Safe options are water, black coffee, plain tea, soda, Gatorade/Powerade, clear broth, apple juice.    2 hours before your scheduled arrival time, STOP drinking clear liquids.    You  may need to take another shower with a germ-killing soap before you leave home in the morning. Do not use perfumes, colognes, or body lotions.  Wear comfortable loose-fitting clothing.  Remove all jewelry including body piercing and rings, dark colored nail polish, and make up prior to arrival at the hospital. Leave all valuables at home.   Bring your hearing aids if you rely on them.  Do not wear contact lenses. If you wear eyeglasses remember to bring a case to store them in while you are in surgery.  Do not use denture adhesives since you will be asked to remove them during your surgery.    You do not need to bring your home medications into the hospital.   Bring your sleep apnea device with you on the day of your surgery (if this applies to you).  If you have an Inspire implant for sleep apnea, please bring the remote with you on the day of surgery.  If you wear portable oxygen, bring it with you.   If you are staying overnight, you may bring a bag of items you may need such as slippers, robe and a change of clothes for your discharge. You may want to leave these items in the car until you are ready for them since your family will take your belongings when you leave the pre-operative area.  Arrive at the hospital as scheduled by the office. You will be asked to arrive 2 hours prior to your surgery time in order to prepare for your procedure.  When you arrive at the hospital  Go to the registration desk located at the main entrance of the hospital.  After registration is completed, you will be given a beeper and a sticker sheet. Take the stickers to Outpatient Surgery and place in the tray at the end of the desk to notify the staff that you have arrived and registered.   Return to the lobby to wait. You are not always called back according to the time of arrival but rather the time your doctor will be ready.  When your beeper lights up and vibrates proceed through the double doors, under the stairs, and a member  of the Outpatient Surgery staff will escort you to your preoperative room.

## 2025-07-14 ENCOUNTER — ANESTHESIA EVENT (OUTPATIENT)
Dept: PERIOP | Facility: HOSPITAL | Age: 40
End: 2025-07-14
Payer: OTHER GOVERNMENT

## 2025-07-14 ENCOUNTER — HOSPITAL ENCOUNTER (OUTPATIENT)
Facility: HOSPITAL | Age: 40
Setting detail: HOSPITAL OUTPATIENT SURGERY
Discharge: HOME | End: 2025-07-14
Attending: ORTHOPAEDIC SURGERY | Admitting: ORTHOPAEDIC SURGERY
Payer: OTHER GOVERNMENT

## 2025-07-14 ENCOUNTER — ANESTHESIA (OUTPATIENT)
Dept: PERIOP | Facility: HOSPITAL | Age: 40
End: 2025-07-14
Payer: OTHER GOVERNMENT

## 2025-07-14 ENCOUNTER — APPOINTMENT (OUTPATIENT)
Dept: GENERAL RADIOLOGY | Facility: HOSPITAL | Age: 40
End: 2025-07-14
Payer: OTHER GOVERNMENT

## 2025-07-14 ENCOUNTER — TELEPHONE (OUTPATIENT)
Age: 40
End: 2025-07-14

## 2025-07-14 VITALS
HEART RATE: 63 BPM | TEMPERATURE: 98 F | SYSTOLIC BLOOD PRESSURE: 119 MMHG | RESPIRATION RATE: 20 BRPM | DIASTOLIC BLOOD PRESSURE: 83 MMHG | OXYGEN SATURATION: 95 %

## 2025-07-14 DIAGNOSIS — S43.432A GLENOID LABRAL TEAR, LEFT, INITIAL ENCOUNTER: Primary | ICD-10-CM

## 2025-07-14 PROCEDURE — C1713 ANCHOR/SCREW BN/BN,TIS/BN: HCPCS | Performed by: ORTHOPAEDIC SURGERY

## 2025-07-14 PROCEDURE — 25010000002 CEFAZOLIN PER 500 MG: Performed by: ORTHOPAEDIC SURGERY

## 2025-07-14 PROCEDURE — 25810000003 LACTATED RINGERS PER 1000 ML: Performed by: ORTHOPAEDIC SURGERY

## 2025-07-14 PROCEDURE — 25010000002 PROPOFOL 10 MG/ML EMULSION

## 2025-07-14 PROCEDURE — 25010000002 LIDOCAINE PF 2% 2 % SOLUTION

## 2025-07-14 PROCEDURE — 25010000002 ONDANSETRON PER 1 MG

## 2025-07-14 PROCEDURE — 25010000002 ROPIVACAINE PER 1 MG: Performed by: ANESTHESIOLOGY

## 2025-07-14 PROCEDURE — 25010000002 FENTANYL CITRATE (PF) 50 MCG/ML SOLUTION: Performed by: ANESTHESIOLOGY

## 2025-07-14 PROCEDURE — 25010000002 SUGAMMADEX 200 MG/2ML SOLUTION: Performed by: NURSE ANESTHETIST, CERTIFIED REGISTERED

## 2025-07-14 PROCEDURE — 25010000002 DEXAMETHASONE PER 1 MG: Performed by: ANESTHESIOLOGY

## 2025-07-14 PROCEDURE — 25010000002 EPINEPHRINE 1 MG/ML SOLUTION: Performed by: ORTHOPAEDIC SURGERY

## 2025-07-14 PROCEDURE — 25010000002 MIDAZOLAM PER 1MG: Performed by: ANESTHESIOLOGY

## 2025-07-14 PROCEDURE — 25010000002 FENTANYL CITRATE (PF) 100 MCG/2ML SOLUTION

## 2025-07-14 DEVICE — SYS IMP SUT/ANCH TENOD/SHLDR LOOPNTAK BIOCOMP/PUSHLOCK3.9MM: Type: IMPLANTABLE DEVICE | Site: SHOULDER | Status: FUNCTIONAL

## 2025-07-14 DEVICE — SUT TPE TIGERLINK W/CLS/LP 1.7MM WHT/BLK: Type: IMPLANTABLE DEVICE | Site: SHOULDER | Status: FUNCTIONAL

## 2025-07-14 DEVICE — SUT/ANCH FIBERTAK GEN2 KNOTLSS W/NMBR2/SUT 1.8MM: Type: IMPLANTABLE DEVICE | Site: SHOULDER | Status: FUNCTIONAL

## 2025-07-14 RX ORDER — SODIUM CHLORIDE, SODIUM LACTATE, POTASSIUM CHLORIDE, CALCIUM CHLORIDE 600; 310; 30; 20 MG/100ML; MG/100ML; MG/100ML; MG/100ML
1000 INJECTION, SOLUTION INTRAVENOUS CONTINUOUS
Status: DISCONTINUED | OUTPATIENT
Start: 2025-07-14 | End: 2025-07-14 | Stop reason: HOSPADM

## 2025-07-14 RX ORDER — LIDOCAINE HYDROCHLORIDE 20 MG/ML
INJECTION, SOLUTION EPIDURAL; INFILTRATION; INTRACAUDAL; PERINEURAL AS NEEDED
Status: DISCONTINUED | OUTPATIENT
Start: 2025-07-14 | End: 2025-07-14 | Stop reason: SURG

## 2025-07-14 RX ORDER — HYDROCODONE BITARTRATE AND ACETAMINOPHEN 7.5; 325 MG/1; MG/1
1 TABLET ORAL EVERY 4 HOURS PRN
Qty: 42 TABLET | Refills: 0 | Status: SHIPPED | OUTPATIENT
Start: 2025-07-14

## 2025-07-14 RX ORDER — SODIUM CHLORIDE 0.9 % (FLUSH) 0.9 %
3 SYRINGE (ML) INJECTION AS NEEDED
Status: DISCONTINUED | OUTPATIENT
Start: 2025-07-14 | End: 2025-07-14 | Stop reason: HOSPADM

## 2025-07-14 RX ORDER — MIDAZOLAM HYDROCHLORIDE 2 MG/2ML
2 INJECTION, SOLUTION INTRAMUSCULAR; INTRAVENOUS ONCE
Status: COMPLETED | OUTPATIENT
Start: 2025-07-14 | End: 2025-07-14

## 2025-07-14 RX ORDER — FENTANYL CITRATE 50 UG/ML
50 INJECTION, SOLUTION INTRAMUSCULAR; INTRAVENOUS ONCE
Refills: 0 | Status: COMPLETED | OUTPATIENT
Start: 2025-07-14 | End: 2025-07-14

## 2025-07-14 RX ORDER — FENTANYL CITRATE 50 UG/ML
25 INJECTION, SOLUTION INTRAMUSCULAR; INTRAVENOUS
Status: DISCONTINUED | OUTPATIENT
Start: 2025-07-14 | End: 2025-07-14 | Stop reason: HOSPADM

## 2025-07-14 RX ORDER — FLUMAZENIL 0.1 MG/ML
0.2 INJECTION INTRAVENOUS AS NEEDED
Status: DISCONTINUED | OUTPATIENT
Start: 2025-07-14 | End: 2025-07-14 | Stop reason: HOSPADM

## 2025-07-14 RX ORDER — DEXTROSE MONOHYDRATE 25 G/50ML
12.5 INJECTION, SOLUTION INTRAVENOUS AS NEEDED
Status: DISCONTINUED | OUTPATIENT
Start: 2025-07-14 | End: 2025-07-14 | Stop reason: HOSPADM

## 2025-07-14 RX ORDER — ONDANSETRON 2 MG/ML
INJECTION INTRAMUSCULAR; INTRAVENOUS AS NEEDED
Status: DISCONTINUED | OUTPATIENT
Start: 2025-07-14 | End: 2025-07-14 | Stop reason: SURG

## 2025-07-14 RX ORDER — IBUPROFEN 600 MG/1
600 TABLET, FILM COATED ORAL EVERY 6 HOURS PRN
Status: DISCONTINUED | OUTPATIENT
Start: 2025-07-14 | End: 2025-07-14 | Stop reason: HOSPADM

## 2025-07-14 RX ORDER — ROPIVACAINE HYDROCHLORIDE 5 MG/ML
INJECTION, SOLUTION EPIDURAL; INFILTRATION; PERINEURAL
Status: COMPLETED | OUTPATIENT
Start: 2025-07-14 | End: 2025-07-14

## 2025-07-14 RX ORDER — HYDROCODONE BITARTRATE AND ACETAMINOPHEN 5; 325 MG/1; MG/1
1 TABLET ORAL EVERY 4 HOURS PRN
Status: DISCONTINUED | OUTPATIENT
Start: 2025-07-14 | End: 2025-07-14 | Stop reason: HOSPADM

## 2025-07-14 RX ORDER — LABETALOL HYDROCHLORIDE 5 MG/ML
5 INJECTION, SOLUTION INTRAVENOUS
Status: DISCONTINUED | OUTPATIENT
Start: 2025-07-14 | End: 2025-07-14 | Stop reason: HOSPADM

## 2025-07-14 RX ORDER — ROCURONIUM BROMIDE 10 MG/ML
INJECTION, SOLUTION INTRAVENOUS AS NEEDED
Status: DISCONTINUED | OUTPATIENT
Start: 2025-07-14 | End: 2025-07-14 | Stop reason: SURG

## 2025-07-14 RX ORDER — NALOXONE HCL 0.4 MG/ML
0.4 VIAL (ML) INJECTION AS NEEDED
Status: DISCONTINUED | OUTPATIENT
Start: 2025-07-14 | End: 2025-07-14 | Stop reason: HOSPADM

## 2025-07-14 RX ORDER — ONDANSETRON 4 MG/1
4 TABLET, FILM COATED ORAL EVERY 8 HOURS PRN
Qty: 20 TABLET | Refills: 1 | Status: SHIPPED | OUTPATIENT
Start: 2025-07-14

## 2025-07-14 RX ORDER — HYDROCODONE BITARTRATE AND ACETAMINOPHEN 10; 325 MG/1; MG/1
1 TABLET ORAL EVERY 4 HOURS PRN
Status: DISCONTINUED | OUTPATIENT
Start: 2025-07-14 | End: 2025-07-14 | Stop reason: HOSPADM

## 2025-07-14 RX ORDER — MIDAZOLAM HYDROCHLORIDE 2 MG/2ML
1 INJECTION, SOLUTION INTRAMUSCULAR; INTRAVENOUS
Status: DISCONTINUED | OUTPATIENT
Start: 2025-07-14 | End: 2025-07-14 | Stop reason: HOSPADM

## 2025-07-14 RX ORDER — LIDOCAINE HYDROCHLORIDE 10 MG/ML
0.5 INJECTION, SOLUTION EPIDURAL; INFILTRATION; INTRACAUDAL; PERINEURAL ONCE AS NEEDED
Status: DISCONTINUED | OUTPATIENT
Start: 2025-07-14 | End: 2025-07-14 | Stop reason: HOSPADM

## 2025-07-14 RX ORDER — FENTANYL CITRATE 50 UG/ML
INJECTION, SOLUTION INTRAMUSCULAR; INTRAVENOUS AS NEEDED
Status: DISCONTINUED | OUTPATIENT
Start: 2025-07-14 | End: 2025-07-14 | Stop reason: SURG

## 2025-07-14 RX ORDER — DOCUSATE SODIUM 100 MG/1
100 CAPSULE, LIQUID FILLED ORAL 2 TIMES DAILY
Qty: 60 CAPSULE | Refills: 1 | Status: SHIPPED | OUTPATIENT
Start: 2025-07-14

## 2025-07-14 RX ORDER — FENTANYL CITRATE 50 UG/ML
50 INJECTION, SOLUTION INTRAMUSCULAR; INTRAVENOUS
Status: DISCONTINUED | OUTPATIENT
Start: 2025-07-14 | End: 2025-07-14 | Stop reason: HOSPADM

## 2025-07-14 RX ORDER — CYCLOBENZAPRINE HCL 10 MG
10 TABLET ORAL 3 TIMES DAILY PRN
Qty: 30 TABLET | Refills: 0 | Status: SHIPPED | OUTPATIENT
Start: 2025-07-14

## 2025-07-14 RX ORDER — ONDANSETRON 2 MG/ML
4 INJECTION INTRAMUSCULAR; INTRAVENOUS ONCE AS NEEDED
Status: DISCONTINUED | OUTPATIENT
Start: 2025-07-14 | End: 2025-07-14 | Stop reason: HOSPADM

## 2025-07-14 RX ORDER — MAGNESIUM HYDROXIDE 1200 MG/15ML
LIQUID ORAL AS NEEDED
Status: DISCONTINUED | OUTPATIENT
Start: 2025-07-14 | End: 2025-07-14 | Stop reason: HOSPADM

## 2025-07-14 RX ORDER — PROPOFOL 10 MG/ML
VIAL (ML) INTRAVENOUS AS NEEDED
Status: DISCONTINUED | OUTPATIENT
Start: 2025-07-14 | End: 2025-07-14 | Stop reason: SURG

## 2025-07-14 RX ORDER — DEXAMETHASONE SODIUM PHOSPHATE 4 MG/ML
4 INJECTION, SOLUTION INTRA-ARTICULAR; INTRALESIONAL; INTRAMUSCULAR; INTRAVENOUS; SOFT TISSUE ONCE AS NEEDED
Status: COMPLETED | OUTPATIENT
Start: 2025-07-14 | End: 2025-07-14

## 2025-07-14 RX ORDER — SODIUM CHLORIDE 0.9 % (FLUSH) 0.9 %
10 SYRINGE (ML) INJECTION EVERY 12 HOURS SCHEDULED
Status: DISCONTINUED | OUTPATIENT
Start: 2025-07-14 | End: 2025-07-14 | Stop reason: HOSPADM

## 2025-07-14 RX ORDER — SCOPOLAMINE 1 MG/3D
1 PATCH, EXTENDED RELEASE TRANSDERMAL ONCE
Status: DISCONTINUED | OUTPATIENT
Start: 2025-07-14 | End: 2025-07-14 | Stop reason: HOSPADM

## 2025-07-14 RX ORDER — ASPIRIN 81 MG/1
81 TABLET ORAL 2 TIMES DAILY
Qty: 42 TABLET | Refills: 0 | Status: SHIPPED | OUTPATIENT
Start: 2025-07-14 | End: 2025-08-04

## 2025-07-14 RX ORDER — SODIUM CHLORIDE 0.9 % (FLUSH) 0.9 %
10 SYRINGE (ML) INJECTION AS NEEDED
Status: DISCONTINUED | OUTPATIENT
Start: 2025-07-14 | End: 2025-07-14 | Stop reason: HOSPADM

## 2025-07-14 RX ADMIN — PROPOFOL 200 MG: 10 INJECTION, EMULSION INTRAVENOUS at 11:50

## 2025-07-14 RX ADMIN — SUGAMMADEX 200 MG: 100 INJECTION, SOLUTION INTRAVENOUS at 13:00

## 2025-07-14 RX ADMIN — ONDANSETRON 4 MG: 2 INJECTION INTRAMUSCULAR; INTRAVENOUS at 12:30

## 2025-07-14 RX ADMIN — ROCURONIUM BROMIDE 50 MG: 10 INJECTION, SOLUTION INTRAVENOUS at 11:50

## 2025-07-14 RX ADMIN — ROPIVACAINE HYDROCHLORIDE 20 ML: 5 INJECTION, SOLUTION EPIDURAL; INFILTRATION; PERINEURAL at 10:30

## 2025-07-14 RX ADMIN — FENTANYL CITRATE 50 MCG: 50 INJECTION, SOLUTION INTRAMUSCULAR; INTRAVENOUS at 10:26

## 2025-07-14 RX ADMIN — DEXAMETHASONE SODIUM PHOSPHATE 4 MG: 4 INJECTION, SOLUTION INTRA-ARTICULAR; INTRALESIONAL; INTRAMUSCULAR; INTRAVENOUS; SOFT TISSUE at 10:26

## 2025-07-14 RX ADMIN — SCOPOLAMINE 1 PATCH: 1.5 PATCH, EXTENDED RELEASE TRANSDERMAL at 10:26

## 2025-07-14 RX ADMIN — MIDAZOLAM HYDROCHLORIDE 2 MG: 1 INJECTION, SOLUTION INTRAMUSCULAR; INTRAVENOUS at 10:26

## 2025-07-14 RX ADMIN — PROPOFOL 50 MG: 10 INJECTION, EMULSION INTRAVENOUS at 12:59

## 2025-07-14 RX ADMIN — CEFAZOLIN 2000 MG: 2 INJECTION, POWDER, FOR SOLUTION INTRAMUSCULAR; INTRAVENOUS at 11:56

## 2025-07-14 RX ADMIN — LIDOCAINE HYDROCHLORIDE 100 MG: 20 INJECTION, SOLUTION EPIDURAL; INFILTRATION; INTRACAUDAL; PERINEURAL at 11:50

## 2025-07-14 RX ADMIN — SODIUM CHLORIDE, POTASSIUM CHLORIDE, SODIUM LACTATE AND CALCIUM CHLORIDE 1000 ML: 600; 310; 30; 20 INJECTION, SOLUTION INTRAVENOUS at 09:11

## 2025-07-14 RX ADMIN — FENTANYL CITRATE 100 MCG: 50 INJECTION, SOLUTION INTRAMUSCULAR; INTRAVENOUS at 11:46

## 2025-07-14 NOTE — ANESTHESIA PROCEDURE NOTES
Peripheral Block      Patient reassessed immediately prior to procedure    Patient location during procedure: holding area  Start time: 7/14/2025 10:28 AM  Stop time: 7/14/2025 10:30 AM  Reason for block: procedure for pain, at surgeon's request, post-op pain management and Request by   Performed by  Anesthesiologist: Jaydon Christy MD  Preanesthetic Checklist  Completed: patient identified, IV checked, site marked, risks and benefits discussed, surgical consent, monitors and equipment checked, pre-op evaluation and timeout performed  Prep:  Pt Position: supine  Sterile barriers:gloves  Prep: ChloraPrep  Patient monitoring: blood pressure monitoring, continuous pulse oximetry and EKG  Procedure    Sedation: yes    Guidance:ultrasound guided and Brachial plexus identified and local anesthetic seen surrounding nerves    ULTRASOUND INTERPRETATION.  Using ultrasound guidance a 22 G gauge needle was placed in close proximity to the nerve, at which point, under ultrasound guidance anesthetic was injected in the area of the nerve and spread of the anesthesia was seen on ultrasound in close proximity thereto.  There were no abnormalities seen on ultrasound; a digital image was taken; and the patient tolerated the procedure with no complications. Images:still images obtained, printed/placed on chart (picture printed and placed in patients chart)  Loss of twitch: 0.5 mA  Laterality:left  Block Type:interscalene  Injection Technique:single-shot  Needle Type:echogenic  Needle Gauge:22 G      Medications Used: ropivacaine (NAROPIN) injection 0.5 % - Injection   20 mL - 7/14/2025 10:30:00 AM      Post Assessment  Injection Assessment: negative aspiration for heme, no paresthesia on injection and incremental injection  Patient Tolerance:comfortable throughout block  Complications:no  Performed by: Jaydon Christy MD

## 2025-07-14 NOTE — H&P
Pt Name: Shane Epps  MRN: [id number]  YOB: 1985  Date: 7/14/2025      HPI: 40 y.o. year old male with a known hearing tear and rotator cuff tear of the left shoulder. Chronic pain and weakness have been nonresponsive to conservative treatments, not limited to, NSAIDs, corticosteroid injections, and physical therapy.      Past Medical/Surgical History:   Past Medical History:   Diagnosis Date    Environmental allergies 07/26/2022    Head injury     History of Guillain-Northville syndrome 2001    PORT PLACED FOR TREATMENT    Mild intermittent asthma without complication 06/27/2022    Mild intermittent asthma without complication 09/12/2023    Shingles      Past Surgical History:   Procedure Laterality Date    COSMETIC SURGERY      MAXILLARY FRACTURE CLOSED REDUCTION      MEDIPORT INSERTION, SINGLE      VENOUS ACCESS DEVICE (PORT) REMOVAL      WISDOM TOOTH EXTRACTION         Social History:   Smoking: Smokeless tobacco snuff and vaping  Alcohol: does not drink    Medications:   Current Facility-Administered Medications   Medication Dose Route Frequency Provider Last Rate Last Admin    ceFAZolin 2000 mg IVPB in 100 mL NS (MBP)  2,000 mg Intravenous Once Rigoberto High MD        dextrose (D50W) (25 g/50 mL) IV injection 12.5 g  12.5 g Intravenous PRN Jaydon Christy MD        fentaNYL citrate (PF) (SUBLIMAZE) injection 25 mcg  25 mcg Intravenous Q5 Min PRN Jaydon Christy MD        lactated ringers infusion 1,000 mL  1,000 mL Intravenous Continuous Rigoberto High MD 25 mL/hr at 07/14/25 0911 1,000 mL at 07/14/25 0911    lidocaine PF 1% (XYLOCAINE) injection 0.5 mL  0.5 mL Intradermal Once PRN Rigoberto High MD        Midazolam HCl (PF) (VERSED) injection 1 mg  1 mg Intravenous Q10 Min PRN Jaydon Christy MD        scopolamine patch 1 mg/72 hr  1 patch Transdermal Once Jaydon Christy MD   1 patch at 07/14/25 1026    sodium chloride 0.9 % flush 10 mL  10 mL  Intravenous Q12H Jaydon Christy MD        sodium chloride 0.9 % flush 10 mL  10 mL Intravenous PRN Jaydon Christy MD        sodium chloride 0.9 % flush 3 mL  3 mL Intravenous PRN Rigoberto High MD           Allergies:   Allergies   Allergen Reactions    Sulfa Antibiotics Anaphylaxis       Review of systems:     * Constitutional: negative     * HEENT: negative     * Skin: negative     * Cardiac: negative     * Respiratory: negative     * GI: negative     * : negative     * Neuro: negative     * CNS: negative     * Extremities: negative     * Endocrine: negative     Physical Exam:   /81   Pulse 69   Temp 97.2 °F (36.2 °C) (Temporal)   Resp 16   SpO2 92%     - General: normal development and appearance     - HEENT: normocephalic, MARIANA     - Skin: pink, warm, normal tone     - Neck: supple, no masses,     - Chest: no rales or wheezes, normal expansion     - Heart: RRR, no murmurs/gallops     - Abdomen: soft, nondistended, nontender, normal sounds     - Vascular: normal pulses, color, tone     - Pysch/Neuro: normal affect, mood, alert and oriented     - Musculoskeletal: Physical exam of the patient's left shoulder shows the skin is clean, dry, and intact. No deformities, lesions, or erythema noted. Neurovascularly intact distally and laterally. Tenderness to palpation laterally. Decreased range of motion and weakness secondary to pain.      Impression: Rotator cuff tear as well as anterior labral tear of the left shoulder.      Surgical Plan: Arthroscopic rotator cuff repair and anterior labral repair of the left shoulder.      Electronically signed by Rigoberto High MD on 7/14/2025 at 11:02 CDT

## 2025-07-14 NOTE — OP NOTE
Patient Name: Lucia  : 1985  MRN: [id number]    DATE of SURGERY: 2025     SURGEON: Rigoberto High MD    ASSISTANT: NONE    PREOPERATIVE DIAGNOSIS:  1) Acute traumatic incomplete rotator cuff tear of the Left shoulder  2) Anterior glenoid labral lesion, Left shoulder      POSTOPERATIVE DIAGNOSIS:  1) Acute traumatic incomplete rotator cuff tear of the Left shoulder  2) Anterior glenoid labral lesion, Left shoulder      PROCEDURE PERFORMED:  1) Left shoulder arthroscopic rotator cuff repair  2) Left shoulder arthroscopic labral repair    IMPLANTS: Arthrex 3.9 mm BioSwiveLock anchor x 1, Arthrex 1.8 mm Labral FiberTak anchor x 3    ANESTHESIA USED: General endotracheal anesthesia, interscalene block    ESTIMATED BLOOD LOSS: minimal    DRAINS: none     COMPLICATIONS: none    SPECIMENS: none    OPERATIVE INDICATIONS: This patient is a 40 y.o. male presented to my clinic with complaints of progressive shoulder pain after a traumatic injury to the shoulder. An MRI was obtained which showed the above-named diagnoses. Pain was not relieved with conservative treatment consisting of anti-inflammatories, corticosteroid injections or physical therapy.  Due to progressive pain and loss of function, surgical evaluation was discussed, and the patient wished to proceed understanding risks, benefits, and alternatives. The surgical indication was to relieve pain, improve function, and prevent future disability in regard to the shoulder pathology dictated in the above-named diagnoses.  Risks included, but were not limited to, that of anesthesia, bleeding, infection, pain, damage to local structures, need for further surgery, failure of repair, stiffness, failure of implants, and loss of function.      OPERATIVE FINDINGS:  1) Exam under anesthesia:  Full passive ROM, joint stable without laxity, skin intact  2) Glenohumeral Joint:       A) Chondral surfaces: Intact          B) Labrum: Anterior glenoid labral tear,  unstable to probing       C) Biceps:  Intact without tear       D) Rotator Cuff:  Partial tear of the subscapularis  3) Subacromial space:         A) Small amount of avascular bursa         B) Type 2 acromion, hypertrophic coracoacromial ligament         C) Bursal surface rotator cuff:  Intact without tear          D) AC joint:  Intact without arthritic change      PROCEDURE in DETAIL:  The patient was seen in the preoperative holding room, once again the informed consent was reviewed with the patient and signed.  The site of surgery was marked with the patient's agreement.  After being transported to the operating room, a timeout was performed identifying the correct patient as well as the operative site.  Dose appropriate IV antibiotics were given prior to incision.  The patient was positioned in the beach chair position, all bony prominences were protected, and a sterile prep and drape was performed.  A standard posterior arthroscopy portal was established, and an outside-in technique was utilized to establish an anterior portal within the rotator interval.  An arthroscopic probe was inserted and a thorough exam of the glenohumeral joint was performed.    Attention was first turned to the biceps labral anchor.  A ramp test was performed on the biceps tendon.  The biceps at the superior labrum appeared to be intact without tear or synovitis noted.  The chondral surfaces were intact with evidence of arthritic change.  The anterior labrum did demonstrate a displaced unstable tear.  A Milton/liberator was used to mobilize the tissue for repair purposes.    Starting at the 8 o'clock position, an Arthrex 1.8 mm labral fiber tack anchor was placed using a self retrieving suture passer to mobilize the capsule labral tissue and approximating it up to the level of the glenoid surface.  2 additional anchors, totaling 3, were placed approximately 1 cm apart of the anterior glenoid surface restoring the labral integrity.  All  suture tails were cut flush.    Attention was then turned to the intraarticular portion of the rotator cuff.  An arthroscopic probe was used to demonstrate the subscapularis demonstrated partial-thickness articular-sided tearing.  An Arthrex FiberLink was placed around the torn fibers creating a cinch suture construct.  The free tail was then placed through the eyelet of an Arthrex 3.9 mm BioSwiveLock anchor that was advanced into the lesser tuberosity restoring the Subscapularis footprint.    Attention was then turned to the intraarticular portion of the rotator cuff.  The remainder of the rotator cuff from the articular space was noted to be intact.    The arthroscope was then transitioned to the subacromial space and an outside-in technique was used to establish a lateral portal.  The arthroscopic shaver was introduced in the subacromial space and a complete bursectomy was performed with this device.  The underlying rotator cuff was inspected.   The bursal surface of the rotator cuff was also inspected and noted to be intact.     Attention was turned to the anterior and lateral edge of the acromion where soft tissue was removed with a cautery device.  The underlying acromion was inspected and noted to demonstrate a type 2 morphology without obvious signs of external impingement.      Attention was then turned to the acromioclavicular joint where a cautery device was utilized to remove soft tissue from the distal end of the clavicle revealing appropriately patent joint space in the absence of any obvious osteophyte formation.      Free fluid was suctioned from the shoulder, and the portals were then closed with Monocryl, and a sterile dressing was applied followed by a sling.  The patient was awakened from anesthesia, transported to the recovery room in stable condition.    POSTOP PLAN:    1) Discharge home with family  2) Follow up in 2 weeks for a clinical check  3) Follow the following protocol: Small/Medium  RCR  Labral Repair (anterior) - based on the patient's tissue quality and repair construct, we will likely initiate formal physical therapy at the 6-week postoperative rober.    Rigoberto High MD     Date: 7/14/2025  Time: 11:10 CDT    Please note that portions of this note were completed with a voice recognition program.     1

## 2025-07-14 NOTE — TELEPHONE ENCOUNTER
Pt wife called and said she would like to talk to ma PT when he can start and if the order can be sent to [facility] in [facility]

## 2025-07-14 NOTE — ANESTHESIA POSTPROCEDURE EVALUATION
Patient: Shane Epps    Procedure Summary       Date: 07/14/25 Room / Location: [facility] OR  /  [facility] OR    Anesthesia Start: 1146 Anesthesia Stop: 1332    Procedure: LEFT SHOULDER ANTERIOR BANKART AND SUBSCAPULARIS REPAIR (Left: Shoulder) Diagnosis: (S43.43ZA, M72.8)    Surgeons: Rigoberto High MD Provider: Steven Foley CRNA    Anesthesia Type: general with block ASA Status: 2            Anesthesia Type: general with block    Vitals  Vitals Value Taken Time   /82 07/14/25 13:39   Temp 98 °F (36.7 °C) 07/14/25 13:39   Pulse 73 07/14/25 13:44   Resp 14 07/14/25 13:39   SpO2 96 % 07/14/25 13:40   Vitals shown include unfiled device data.        Post Anesthesia Care and Evaluation    Patient location during evaluation: PACU  Patient participation: complete - patient participated  Level of consciousness: awake and awake and alert  Pain score: 0  Pain management: adequate    Airway patency: patent  Anesthetic complications: No anesthetic complications  PONV Status: none  Cardiovascular status: acceptable  Respiratory status: acceptable  Hydration status: acceptable    Comments: Patient discharged according to acceptable Stella score per RN assessment. See nursing records for further information.     Blood pressure 123/83, pulse 63, temperature 98 °F (36.7 °C), resp. rate 20, SpO2 97%.

## 2025-07-14 NOTE — DISCHARGE INSTRUCTIONS
Upper Extremity Post-op Instructions     Dr. ROBERTSON      POST-OP CARE: Please follow these instructions closely!     Sling use: The sling is provided for your comfort and to ensure proper healing of your repair following surgery. Please  place the abduction pillow under your arm. Your surgery requires that you wear the sling:  __X__ At all times except bathing, dressing, and therapy. Also wear the sling during sleep.     IMPORTANT PHONE NUMBERS:  For emergencies, please call [phone]  You may reach Dr. Robertson or his office medical staff at [phone] EXT: [phone]  M-F 8:00am-5:00pm  After 5pm or on the weekends, please call [phone] to reach the doctor on call.  Call immediately if you have any of the following symptoms:  Elevated temperature above 101 degrees for more than 48hours after surgery  Persistent drainage from wound  Severe pain around surgical site  Calf pain  Any signs of infection     Dressings: Keep dressing/splint intact unless instructed otherwise below. SOME DRAINAGE IS NORMAL!     DO NOT touch or apply ointment to the incision.     DO NOT remove the steri-strips over the incisions (if you have steri-strips). They will         generally fall off on their own or can be removed 2 weeks after surgery.     If you have yellow gauze and it comes off, do not worry about it. Leave them off.    Signs of infection that warrant a phone call to our clinical line:     o Excessive drainage or redness     o Red streaking coming away from the incision  o Increased pain  o Increased temperature above 101 degrees     Sutures:  If your physician uses sutures in your knee, they will dissolve on their own and will not need to be removed.  Some black sutures occasionally used will need to be removed 10-14 days after surgery.     Bathing:    _X_ Keep your adhesive dressings in place until follow up.  You may remove your cotton dressings and ACE wrap leaving your Steri-Strips in place and begin showering on the 3rd day  following surgery.  Please check the border of the adhesive dressing to ensure there is a good seal before each showering session.  Water may cascade over your adhesive dressing(s) and steri-strips, but do not submerge your dressing(s) and incision(s) in water.     Physical Therapy: Your physical therapy status will be discussed with you postoperatively and at your first post-op appointment. Some injuries will not require physical therapy.     Medications: You will be discharged with the appropriate medications following your surgery. Fill these at the pharmacy and take them as directed on the label. Not all of the medications below may be prescribed. Occasionally, other medications may be prescribed with specific instructions.     Percocet/Lortab (oxycodone/hydrocodone with tylenol) - Pain Medication, will cause drowsiness     o Take 1-2 tablets every 4-6 hours. DO NOT EXCEED 4,000mg of Tylenol in 24 hours.  **DO NOT MIX WITH ALCOHOL, DRIVE WHILE TAKING, OR TAKE with extra TYLENOL**     Colace (Docusate) - stool softener, used for constipation. Take this only if you feel constipated.     Zofran (Ondansetron) or Phenergan - Anti-nausea medication, will cause drowsiness     Aspirin 81 mg - all patients with upper extremity surgery should take one aspirin 81 mg tablet every 12 hours (twice daily) for 21 days after surgery     **If you are running low on pain medications, please notify us if you need a refill 24-48 hours prior to when you run out, so we can make arrangements to refill the prescription for you if we determine it is necessary**         What to expect after a Nerve Block  Nerve blocks administered to block pain affect many types of nerves, including those nerves that control movement, pain, and normal sensation.  Following a nerve block, you may notice some bruising at the site where the block was given.  You may experience sensations such as:  numbness of the affected area or limb, tingling, heaviness  (that is the limb feels heavy to you), weakness or inability to move the affected arm or leg, or a feeling as if your arm or leg has “fallen asleep”.    A nerve block can last from 9-18 hours depending on the medications used.  Certain medications can last up to 72 hours, your anesthesiologist may have discussed this with you pre-op. Usually the weakness wears off first followed by the tingling and heaviness.  As the block wears off, you may begin to notice pain; however, this sequence of events may occur in any order.  Typically, you will be able to move your limb before you will feel it.  Once a nerve block begins to wear off, the effects are usually completely gone within 60 minutes.    If you experience continued side effects that you believe are block related, please call your healthcare provider.  Please see block-specific instructions below.      Instructions for any block involving the shoulder or arm  If you have had any kind of shoulder/arm block, you will go home with your arm in a sling.  Wear the sling until the block has completely worn off or as directed by your doctor.  You may be required to wear it for a longer period of time per your surgeon’s recommendations.  I you have had a shoulder/arm block; it is a good idea to sleep on a recliner with pillows under your arm.    Note:  If you have severe or prolonged shortness of breath, please seek medical assistance as soon as possible.    Protection of a “blocked” arm (limb)  After a nerve block, you cannot feel pain, pressure, or extremes of temperature in the affected limb.  And because of this, your blocked limb is at more risk for injury.  For example, it is possible to burn your limb on an extremely hot surface without feeling it.  When resting, it is important to reposition your limb periodically to avoid prolonged pressure on it.  This may require the use of pillows and padding.  While sleeping, you should avoid rolling onto the affected limb or  putting too much pressure on it.  If you have a cast or tight dressing, check the color of your fingers of the affected limb.  Call your surgeon if they look discolored (that is, dusky, dark colored)  Use caution in cold weather.  Cover your limb appropriately to protect it from the cold.  Pain Management  Your surgeon will give you a prescription for pain medication.  Begin taking this before the nerve block wears off.  Bear in mind that sometimes the block can wear off in the middle of the night.

## 2025-07-14 NOTE — SIGNIFICANT NOTE
Left hand /5 fingers mobile pt states numbness however aware of tactile stimulation color and temp uniform with right hand

## 2025-07-14 NOTE — ANESTHESIA PREPROCEDURE EVALUATION
Anesthesia Evaluation     Patient summary reviewed   no history of anesthetic complications:   NPO Solid Status: > 8 hours             Airway   Mallampati: II  Dental      Pulmonary    (-) COPD, asthma, sleep apnea, not a smoker  Cardiovascular   Exercise tolerance: excellent (>7 METS)    (-) pacemaker, past MI, angina, cardiac stents      Neuro/Psych  (+) headaches  (-) seizures, TIA, CVA  GI/Hepatic/Renal/Endo    (+) obesity  (-) GERD, liver disease, no renal disease, diabetes    Musculoskeletal     Abdominal    Substance History      OB/GYN          Other                    Anesthesia Plan    ASA 2     general with block     intravenous induction     Anesthetic plan, risks, benefits, and alternatives have been provided, discussed and informed consent has been obtained with: patient.    CODE STATUS:

## 2025-07-14 NOTE — BRIEF OP NOTE
SHOULDER BANKHART PROCEDURE  Progress Note    Shane Epps  7/14/2025    Pre-op Diagnosis:   S43.43ZA, M72.8       Post-Op Diagnosis Codes:  SAME    Procedure(s):  Procedure(s):  LEFT SHOULDER ANTERIOR BANKART AND SUBSCAPULARIS REPAIR    Surgeon(s):  Rigoberto High MD    Anesthesia: General with Block    Staff:   Circulator: Alejandra Bright RN; Dayron Smith RN  Scrub Person: London Davis; Lanette Jefferson; Derrell Renteria  Vendor Representative: Chapin Weathers; Cal Pearce     Estimated Blood Loss: minimal    Urine Voided: * No values recorded between 7/14/2025 11:45 AM and 7/14/2025 12:53 PM *    Specimens:                None      Drains: * No LDAs found *    Findings: Anterior glenoid labral tear with partial-thickness subscapularis tear, left shoulder    Complications: None     was responsible for performing the following activities: Retraction, Suction, Irrigation, Suturing, Closing, Placing Dressing, and Held/Positioned Camera and their skilled assistance was necessary for the success of this case.    Rigoberto High MD     Date: 7/14/2025  Time: 13:05 CDT

## 2025-07-14 NOTE — ANESTHESIA PROCEDURE NOTES
Airway  Reason: elective    Date/Time: 7/14/2025 12:00 PM  Airway not difficult    General Information and Staff    Patient location during procedure: OR    Indications and Patient Condition  Indications for airway management: airway protection    Preoxygenated: yes  MILS maintained throughout    Mask difficulty assessment: 1 - vent by mask    Final Airway Details    Final airway type: endotracheal airway      Successful airway: ETT  Cuffed: yes   Successful intubation technique: video laryngoscopy  Adjuncts used in placement: intubating stylet  Endotracheal tube insertion site: oral  Blade: Torres  Blade size: 4  ETT size (mm): 7.5  Cormack-Lehane Classification: grade I - full view of glottis  Placement verified by: chest auscultation and capnometry   Cuff volume (mL): 8  Measured from: teeth  ETT/EBT  to teeth (cm): 22  Number of attempts at approach: 1  Assessment: lips, teeth, and gum same as pre-op and atraumatic intubation

## 2025-07-21 NOTE — PROGRESS NOTES
Subjective        Shane Epps presents to [facility] Neurology    History of Present Illness      39 yo male here for f/u of migraine.  No change in frequency.  Maybe overall decreased severity since we have started any medication.  No significant change since we trialed the nortriptyline.        Current Outpatient Medications:     amphetamine-dextroamphetamine (ADDERALL) 15 MG tablet, Take 1 tablet by mouth Daily. Takes between 11-1300hours, Disp: , Rfl:     amphetamine-dextroamphetamine XR (ADDERALL XR) 30 MG 24 hr capsule, Take 1 capsule by mouth Every Morning, Disp: , Rfl:     aspirin 81 MG EC tablet, Take 1 tablet by mouth 2 (Two) Times a Day for 21 days., Disp: 42 tablet, Rfl: 0    Betaine, Trimethylglycine, (TMG, TRIMETHYLGLYCINE, PO), Take 2 capsules by mouth Daily., Disp: , Rfl:     cyclobenzaprine (FLEXERIL) 10 MG tablet, Take 1 tablet by mouth 3 (Three) Times a Day As Needed for Muscle Spasms., Disp: 30 tablet, Rfl: 0    diazePAM (VALIUM) 2 MG tablet, Take 1 tablet by mouth Every 6 (Six) Hours As Needed. PRN, Disp: , Rfl:     docusate sodium (COLACE) 100 MG capsule, Take 1 capsule by mouth 2 (Two) Times a Day., Disp: 60 capsule, Rfl: 1    HYDROcodone-acetaminophen (NORCO) 7.5-325 MG per tablet, Take 1 tablet by mouth Every 4 (Four) Hours As Needed for Moderate Pain, Disp: 42 tablet, Rfl: 0    MAGNESIUM CITRATE PO, Take 1 tablet/day by mouth Daily. 150 mg, Disp: , Rfl:     METHYLENE BLUE PO, Take 2 capsules by mouth Daily., Disp: , Rfl:     multivitamin (MULTIVITAMIN PO), Take 1 tablet by mouth 2 (Two) Times a Day., Disp: , Rfl:     naloxone (NARCAN) 4 MG/0.1ML nasal spray, Call 911. Don't prime. [place] in 1 nostril for overdose. Repeat in 2-3 minutes in other nostril if no or minimal breathing/responsiveness., Disp: 2 each, Rfl: 0    nortriptyline (Pamelor) 25 MG capsule, Take 1 capsule by mouth Every Night for 180 days. (Patient taking differently: Take 1 capsule by mouth Daily.),  "Disp: 30 capsule, Rfl: 5    ondansetron (Zofran) 4 MG tablet, Take 1 tablet by mouth Every 8 (Eight) Hours As Needed for Nausea or Vomiting., Disp: 20 tablet, Rfl: 1    rizatriptan (Maxalt) 10 MG tablet, Take 1 tablet by mouth 1 (One) Time As Needed for Migraine for up to 90 days. May repeat in 2 hours if needed, Disp: 9 tablet, Rfl: 2    TESTOSTERONE UNDECANOATE PO, Take 2 capsules by mouth 5 (Five) Times a Week., Disp: , Rfl:     Zinc 30 MG tablet, Take 1 tablet by mouth Daily., Disp: , Rfl:        Objective   Vital Signs:   /80   Pulse 95   Ht 174 cm (68.5\")   Wt 93 kg (205 lb)   SpO2 98%   BMI 30.72 kg/m²     Physical Exam  Constitutional:       General: He is awake.   Eyes:      Extraocular Movements: Extraocular movements intact.   Neurological:      Mental Status: He is alert.   Psychiatric:         Speech: Speech normal.        Neurological Exam  Mental Status  Awake and alert. Speech is normal. Language is fluent with no aphasia.    Cranial Nerves  CN III, IV, VI: Extraocular movements intact bilaterally.  CN VII: Full and symmetric facial movement.    Gait  Casual gait is normal including stance, stride, and arm swing.      Result Review :            Results                 Assessment and Plan     Assessment & Plan  40-year-old male with Asthma, ADHD, anxiety referred for headaches.  MRI brain done showed a stable 1.8 cm arachnoid cyst along the left anterior skull base.  He is following with neurosurgery for this.  Repeat scan read as more like a focus of left frontal lobe base cystic encephalomalacia.  No benefit with topiramate. Propranolol contraindicated because of his asthma.  No significant benefit with nortriptyline or topiramate.    Plan:    1.  Stop nortriptyline.  2.  Start Aimovig 140 mg subcutaneous monthly.  3.  Maxalt at onset of migraine.  4.  Follow-up 3 months.    I spent 40 minutes caring for Shane on this date of service. This time includes time spent by me in the following " activities:preparing for the visit, counseling and educating the patient/family/caregiver, ordering medications, tests, or procedures, and documenting information in the medical record      Follow Up   No follow-ups on file.  Patient was given instructions and counseling regarding his condition or for health maintenance advice. Please see specific information pulled into the AVS if appropriate.

## 2025-07-22 ENCOUNTER — TELEPHONE (OUTPATIENT)
Dept: NEUROLOGY | Facility: CLINIC | Age: 40
End: 2025-07-22

## 2025-07-22 ENCOUNTER — OFFICE VISIT (OUTPATIENT)
Dept: NEUROLOGY | Facility: CLINIC | Age: 40
End: 2025-07-22
Payer: OTHER GOVERNMENT

## 2025-07-22 VITALS
BODY MASS INDEX: 30.36 KG/M2 | SYSTOLIC BLOOD PRESSURE: 140 MMHG | WEIGHT: 205 LBS | OXYGEN SATURATION: 98 % | DIASTOLIC BLOOD PRESSURE: 80 MMHG | HEIGHT: 69 IN | HEART RATE: 95 BPM

## 2025-07-22 DIAGNOSIS — G43.719 INTRACTABLE CHRONIC MIGRAINE WITHOUT AURA AND WITHOUT STATUS MIGRAINOSUS: Primary | ICD-10-CM

## 2025-07-22 DIAGNOSIS — G43.719 INTRACTABLE CHRONIC MIGRAINE WITHOUT AURA AND WITHOUT STATUS MIGRAINOSUS: ICD-10-CM

## 2025-07-22 RX ORDER — ERENUMAB-AOOE 140 MG/ML
140 INJECTION, SOLUTION SUBCUTANEOUS
Qty: 1 ML | Refills: 11 | Status: SHIPPED | OUTPATIENT
Start: 2025-07-22 | End: 2025-07-23

## 2025-07-22 NOTE — TELEPHONE ENCOUNTER
Caller: Shane Epps     Best call back number:   Telephone Information:   Mobile [phone]         Which medication are you concerned about: Erenumab-aooe (Aimovig) 140 MG/ML auto-injector       What are your concerns: HE STATES THAT HE RECEIVED A MESSAGE FROM [employer] STATING THE MEDICATION IS BEING DELAYED AND PHARMACIST ADVISED UNABLE TO FILL DUE TO THE [facility]. PLEASE REVIEW AND ADVISE.

## 2025-07-23 RX ORDER — ERENUMAB-AOOE 140 MG/ML
140 INJECTION, SOLUTION SUBCUTANEOUS
Qty: 1 ML | Refills: 11 | Status: SHIPPED | OUTPATIENT
Start: 2025-07-23 | End: 2026-07-23

## 2025-07-28 ENCOUNTER — OFFICE VISIT (OUTPATIENT)
Age: 40
End: 2025-07-28

## 2025-07-28 VITALS — WEIGHT: 194 LBS | BODY MASS INDEX: 28.73 KG/M2 | HEIGHT: 69 IN

## 2025-07-28 DIAGNOSIS — S43.432D BANKART LESION OF LEFT SHOULDER, SUBSEQUENT ENCOUNTER: ICD-10-CM

## 2025-07-28 DIAGNOSIS — S46.812A TRAUMATIC RUPTURE OF SUBSCAPULARIS TENDON OF LEFT SHOULDER: ICD-10-CM

## 2025-07-28 DIAGNOSIS — Z09 SURGERY FOLLOW-UP: Primary | ICD-10-CM

## 2025-07-28 PROCEDURE — 99024 POSTOP FOLLOW-UP VISIT: CPT | Performed by: PHYSICIAN ASSISTANT

## 2025-07-28 RX ORDER — PSEUDOEPHEDRINE HCL 30 MG
100 TABLET ORAL 2 TIMES DAILY
COMMUNITY
Start: 2025-07-14

## 2025-07-28 RX ORDER — VIT C/HESPERIDIN/BIOFLAVONOIDS 500-100 MG
30 TABLET ORAL DAILY
COMMUNITY

## 2025-07-28 RX ORDER — ASPIRIN 81 MG/1
81 TABLET ORAL 2 TIMES DAILY
COMMUNITY
Start: 2025-07-14 | End: 2025-08-05

## 2025-07-28 RX ORDER — HYDROCODONE BITARTRATE AND ACETAMINOPHEN 7.5; 325 MG/1; MG/1
1 TABLET ORAL EVERY 4 HOURS PRN
COMMUNITY
Start: 2025-07-14

## 2025-07-28 RX ORDER — ERENUMAB-AOOE 140 MG/ML
140 INJECTION, SOLUTION SUBCUTANEOUS
COMMUNITY
Start: 2025-07-23 | End: 2026-07-24

## 2025-07-28 RX ORDER — CYCLOBENZAPRINE HCL 10 MG
10 TABLET ORAL 3 TIMES DAILY PRN
COMMUNITY
Start: 2025-07-14

## 2025-07-28 RX ORDER — ONDANSETRON 4 MG/1
4 TABLET, FILM COATED ORAL EVERY 8 HOURS PRN
COMMUNITY
Start: 2025-07-14

## 2025-07-28 RX ORDER — DIPHENOXYLATE HYDROCHLORIDE AND ATROPINE SULFATE 2.5; .025 MG/1; MG/1
1 TABLET ORAL 2 TIMES DAILY
COMMUNITY

## 2025-07-28 ASSESSMENT — ENCOUNTER SYMPTOMS
BACK PAIN: 0
COLOR CHANGE: 0

## 2025-07-28 NOTE — ASSESSMENT & PLAN NOTE
I expressed to the patient the importance of no weightbearing of the extremity and only partaking in pendulum exercises for range of motion.  He can come out of the sling when resting and for pendulum exercises but is to otherwise be in the sling at all times.  He can remove the abduction pillow at this time.  We will see him back in 4 weeks to see how he is progressing and initiate formal physical therapy at that point.

## 2025-08-01 ENCOUNTER — TELEPHONE (OUTPATIENT)
Dept: NEUROLOGY | Facility: CLINIC | Age: 40
End: 2025-08-01
Payer: OTHER GOVERNMENT

## 2025-08-01 NOTE — TELEPHONE ENCOUNTER
Cardiology Consult    Patient:  Paulo Quiroz [birth year] [id number]  Consulting Physician:  Tanvir Goodwin MD    Patient is a 52 year old male seen in consultation for chest pain, consult request from SHOSHANA Penny.    Patient describes a localized spot of chest discomfort to the left of his lower sternum.  This has been going on for at least 5 to 6 years.  Does worsen when he lies on his chest.  Active, no decrease in activity tolerance compared to 1 year ago no activity associated chest pain or shortness of breath  There is no history of orthopnea, paroxysmal nocturnal dyspnea, palpitations, lightheadedness, syncope, edema, claudication, fall.    No smoking  Occasional use of alcohol    No family history of premature CAD    REVIEW OF SYSTEMS:  CONSTITUTIONAL:  No fever, chills or weight loss.  EYES:  No double vision, recent changes in vision.  HENT:  No headaches, tinnitus, throat irritation.  GASTROINTESTINAL:  No bleeds, nausea, vomiting or diarrhea, abdominal pain.  GENITOURINARY:  No hematuria.  RESPIRATORY:  No cough, wheezing or sputum production.  NEUROLOGICAL:  No symptoms compatible with TIA/CVA.  SKIN:  No rashes.  MUSCULOSKELETAL:  No muscle aches or tenderness.  ENDOCRINE:  Denies polyuria or polydipsia.  LYMPHATIC:  Denies swollen glands.  PSYCHIATRIC:  Denies depression or anxiety.      PAST HISTORY:  Dyslipidemia  Carpal tunnel surgery    Review of patient's family status indicates:    Mother                         Alive                     Maternal Grandmother                                     Maternal Grandfather                                     Paternal Grandfather                                     Father                         Alive                     Brother                                                  Paternal Grandmother                                     Brother                        Alive                       Social History     Tobacco Use   Smoking Status  [facility] received a fax that requires your attention. The document has been indexed to the patient’s chart for your review.      Reason for sending: RECORDS REQUEST    Documents Description: PROGRESS NOTE 7/22/25    Name of Sender: [facility]    Date Indexed: 8/1/25       Never   Smokeless Tobacco Never       Social History     Substance and Sexual Activity   Alcohol Use Yes    Alcohol/week: 0.0 - 2.0 standard drinks of alcohol       Current Outpatient Medications   Medication Sig Dispense Refill    Multiple Vitamins-Minerals (vitamin - therapeutic multivitamins w/minerals) tablet 40+      Misc Natural Products (PROSTATE SUPPORT PO)       Omega-3 Fatty Acids (FISH OIL) 1000 MG capsule Take 2 g by mouth daily.      ibuprofen (MOTRIN) 200 MG tablet Take 200 mg by mouth every 6 hours as needed for Pain.      acetaminophen (TYLENOL) 325 MG tablet Take 650 mg by mouth every 4 hours as needed for Pain (take for lesser pain do not use with Norco).       No current facility-administered medications for this visit.       Allergies as of 03/14/2025    (No Known Allergies)       Past Surgical History:   Procedure Laterality Date    Carpal tunnel release Left 07/23/2019    Endoscopic carpal tunnel release/[place]    Tonsillectomy           PHYSICAL EXAMINATION:  Visit Vitals  BP (!) 168/102   Pulse (!) 120   Ht 6' 2\" (1.88 m)   Wt 108.7 kg (239 lb 10.2 oz)   SpO2 97%   BMI 30.77 kg/m²     GENERAL:  Well-developed, well-nourished, no acute distress.  EYE EXAM:  No pallor, no jaundice, no xanthelasma.  ORAL CAVITY:  No cyanosis.  NECK:  No JVD, bruits or thyromegaly.  CHEST WALL:  No tenderness, no deformity.  LUNGS:  Clear to auscultation.  Respiratory effort is normal.  No respiratory distress.  CARDIOVASCULAR:  S1, S2, normal.  No murmurs, gallops or rubs.  No precordial lifts.  ABDOMEN:  Non tender.  Bowel sounds are present.  No bruits.  No hepatosplenomegaly.  EXTREMITIES:  No edema, cyanosis or clubbing.  MUSCULOSKELETAL:  Gait is normal.  SKIN:  No rashes noted.  NEUROLOGIC EXAM:  Patient is oriented x3.  Mood and affect are normal.  PSYCHIATRIC:  Speech and behavior appropriate.      LABS:  WBC (K/mcL)   Date Value   12/05/2024 8.5     HCT (%)   Date Value   12/05/2024 48.4     HGB (g/dL)    Date Value   12/05/2024 16.1     PLT (K/mcL)   Date Value   12/05/2024 244     Potassium (mmol/L)   Date Value   12/05/2024 4.1     Creatinine (mg/dL)   Date Value   12/05/2024 1.03     GFR Estimate, Non  (no units)   Date Value   04/09/2019 >90     CHOLESTEROL (mg/dL)   Date Value   06/14/2019 200 (H)     HDL (mg/dL)   Date Value   06/14/2019 36 (L)     TRIGLYCERIDE (mg/dL)   Date Value   06/14/2019 212 (H)     CALCULATED LDL (mg/dL)   Date Value   06/14/2019 122     No results found for: \"BNP\"      EKG: Sinus rhythm 105 bpm, no significant ST-T changes    CXR:  -    IMPRESSION/PLAN:    52-year-old    Chest pain appears noncardiac (costochondritis)    Dyslipidemia untreated  Will check fasting lipids  We will check a coronary artery calcium score    Blood pressures to be rechecked      Orders Placed This Encounter    CT HEART CALCIUM SCORING    Apolipoprotein B    Lipid Panel With Reflex    SGPT    Electrocardiogram 12-Lead       No follow-ups on file.    Tanvir Goodwin MD

## 2025-08-13 ENCOUNTER — TELEPHONE (OUTPATIENT)
Dept: NEUROLOGY | Facility: CLINIC | Age: 40
End: 2025-08-13
Payer: OTHER GOVERNMENT

## 2025-08-13 ENCOUNTER — OFFICE VISIT (OUTPATIENT)
Dept: NEUROSURGERY | Facility: CLINIC | Age: 40
End: 2025-08-13
Payer: OTHER GOVERNMENT

## 2025-08-13 VITALS — BODY MASS INDEX: 31.1 KG/M2 | WEIGHT: 210 LBS | HEIGHT: 69 IN

## 2025-08-13 DIAGNOSIS — M54.81 OCCIPITAL NEURALGIA OF LEFT SIDE: Primary | ICD-10-CM

## 2025-08-13 DIAGNOSIS — E66.09 CLASS 1 OBESITY DUE TO EXCESS CALORIES WITHOUT SERIOUS COMORBIDITY WITH BODY MASS INDEX (BMI) OF 31.0 TO 31.9 IN ADULT: ICD-10-CM

## 2025-08-13 DIAGNOSIS — H53.9 VISUAL DISTURBANCE: ICD-10-CM

## 2025-08-13 DIAGNOSIS — G43.719 INTRACTABLE CHRONIC MIGRAINE WITHOUT AURA AND WITHOUT STATUS MIGRAINOSUS: ICD-10-CM

## 2025-08-13 DIAGNOSIS — Z72.0 TOBACCO CHEW USE: ICD-10-CM

## 2025-08-13 DIAGNOSIS — G93.0 ARACHNOID CYST: ICD-10-CM

## 2025-08-13 DIAGNOSIS — E66.811 CLASS 1 OBESITY DUE TO EXCESS CALORIES WITHOUT SERIOUS COMORBIDITY WITH BODY MASS INDEX (BMI) OF 31.0 TO 31.9 IN ADULT: ICD-10-CM

## 2025-08-13 PROCEDURE — 99214 OFFICE O/P EST MOD 30 MIN: CPT | Performed by: PHYSICIAN ASSISTANT

## 2025-08-13 RX ORDER — ERENUMAB-AOOE 140 MG/ML
140 INJECTION, SOLUTION SUBCUTANEOUS
Qty: 1 ML | Refills: 11 | Status: SHIPPED | OUTPATIENT
Start: 2025-08-13 | End: 2026-08-13

## 2025-08-13 RX ORDER — ASPIRIN 81 MG/1
81 TABLET ORAL 2 TIMES DAILY
COMMUNITY
Start: 2025-07-14

## 2025-08-18 ENCOUNTER — OFFICE VISIT (OUTPATIENT)
Dept: PULMONOLOGY | Facility: CLINIC | Age: 40
End: 2025-08-18
Payer: OTHER GOVERNMENT

## 2025-08-18 VITALS
SYSTOLIC BLOOD PRESSURE: 122 MMHG | BODY MASS INDEX: 30.46 KG/M2 | WEIGHT: 201 LBS | HEART RATE: 90 BPM | HEIGHT: 68 IN | OXYGEN SATURATION: 95 % | DIASTOLIC BLOOD PRESSURE: 76 MMHG

## 2025-08-18 DIAGNOSIS — J45.20 MILD INTERMITTENT ASTHMA WITHOUT COMPLICATION: Primary | ICD-10-CM

## 2025-08-18 DIAGNOSIS — E66.09 CLASS 1 OBESITY DUE TO EXCESS CALORIES WITHOUT SERIOUS COMORBIDITY WITH BODY MASS INDEX (BMI) OF 30.0 TO 30.9 IN ADULT: Chronic | ICD-10-CM

## 2025-08-18 DIAGNOSIS — Z91.09 ENVIRONMENTAL ALLERGIES: Chronic | ICD-10-CM

## 2025-08-18 DIAGNOSIS — E66.811 CLASS 1 OBESITY DUE TO EXCESS CALORIES WITHOUT SERIOUS COMORBIDITY WITH BODY MASS INDEX (BMI) OF 30.0 TO 30.9 IN ADULT: Chronic | ICD-10-CM

## 2025-08-18 PROCEDURE — 94375 RESPIRATORY FLOW VOLUME LOOP: CPT | Performed by: NURSE PRACTITIONER

## 2025-08-18 PROCEDURE — 99214 OFFICE O/P EST MOD 30 MIN: CPT | Performed by: NURSE PRACTITIONER

## 2025-08-18 RX ORDER — FLUTICASONE FUROATE AND VILANTEROL 100; 25 UG/1; UG/1
1 POWDER RESPIRATORY (INHALATION) DAILY
Qty: 3 EACH | Refills: 3 | Status: SHIPPED | OUTPATIENT
Start: 2025-08-18 | End: 2025-08-18

## 2025-08-18 RX ORDER — ALBUTEROL SULFATE 90 UG/1
2 INHALANT RESPIRATORY (INHALATION) EVERY 4 HOURS PRN
Qty: 18 G | Refills: 5 | Status: SHIPPED | OUTPATIENT
Start: 2025-08-18 | End: 2025-08-18

## 2025-08-18 RX ORDER — ALBUTEROL SULFATE 90 UG/1
2 INHALANT RESPIRATORY (INHALATION) EVERY 4 HOURS PRN
Qty: 18 G | Refills: 5 | Status: SHIPPED | OUTPATIENT
Start: 2025-08-18 | End: 2025-09-17

## 2025-08-18 RX ORDER — FLUTICASONE FUROATE AND VILANTEROL 100; 25 UG/1; UG/1
1 POWDER RESPIRATORY (INHALATION) DAILY
Qty: 3 EACH | Refills: 3 | Status: SHIPPED | OUTPATIENT
Start: 2025-08-18 | End: 2025-11-16

## 2025-08-26 ENCOUNTER — OFFICE VISIT (OUTPATIENT)
Age: 40
End: 2025-08-26

## 2025-08-26 DIAGNOSIS — Z98.890 STATUS POST LEFT ROTATOR CUFF REPAIR: ICD-10-CM

## 2025-08-26 DIAGNOSIS — S46.812A TRAUMATIC RUPTURE OF SUBSCAPULARIS TENDON OF LEFT SHOULDER: Primary | ICD-10-CM

## 2025-08-26 PROCEDURE — 99024 POSTOP FOLLOW-UP VISIT: CPT | Performed by: ORTHOPAEDIC SURGERY

## 2025-08-26 ASSESSMENT — ENCOUNTER SYMPTOMS
RESPIRATORY NEGATIVE: 1
GASTROINTESTINAL NEGATIVE: 1
EYES NEGATIVE: 1
ALLERGIC/IMMUNOLOGIC NEGATIVE: 1

## 2025-08-27 PROBLEM — Z09 SURGERY FOLLOW-UP: Status: RESOLVED | Noted: 2025-07-28 | Resolved: 2025-08-27

## (undated) DEVICE — DRP SURG U/DRP INVISISHIELD PA 48X52IN

## (undated) DEVICE — BANDAGE GZ W2XL75IN ST RAYON POLY CNFRM STRTCH LTWT

## (undated) DEVICE — BANDAGE COMPR W4INXL15FT BGE E SGL LAYERED CLP CLSR

## (undated) DEVICE — SUTURE ETHLN SZ 3-0 L18IN NONABSORBABLE BLK FS-1 L24MM 3/8 663H

## (undated) DEVICE — TP NDL HD/SCORPION W/MEGALOADER 1P/U

## (undated) DEVICE — AMBU AURA-I U SIZE 4, DISPOSABLE LARYNGEAL MASK: Brand: AURA-I

## (undated) DEVICE — SOLUTION IV IRRIG POUR BRL 0.9% SODIUM CHL 2F7124

## (undated) DEVICE — SUTURE FIBERWIRE SZ 3-0 L18IN BLU L16.3MM 3/8 CIR REV CUT AR722702

## (undated) DEVICE — BUR BRL FORMLA 6FLUT 4MM

## (undated) DEVICE — Device

## (undated) DEVICE — PROB ABLAT SERFAS ENERGY 90S 3.5MM

## (undated) DEVICE — SUCTION MAT (LOW PROFILE), 50X34: Brand: NEPTUNE

## (undated) DEVICE — KT SHLDR SUSP

## (undated) DEVICE — GLOVE SURG SZ 8 L12IN FNGR THK79MIL GRN LTX FREE

## (undated) DEVICE — Z INACTIVE USE 2660664 SOLUTION IRRIG 3000ML 0.9% SOD CHL USP UROMATIC PLAS CONT

## (undated) DEVICE — TUBING, SUCTION, 1/4" X 12', STRAIGHT: Brand: MEDLINE

## (undated) DEVICE — ZIMMER® STERILE DISPOSABLE TOURNIQUET CUFF WITH PLC, DUAL PORT, SINGLE BLADDER, 18 IN. (46 CM)

## (undated) DEVICE — SUT MONOCRYL PLS ANTIB UND 3/0  PS1 27IN

## (undated) DEVICE — Y-TYPE TUR/BLADDER IRRIGATION SET, REGULATING CLAMP

## (undated) DEVICE — SUTURE PROL SZ 6-0 L30IN NONABSORBABLE BLU L13MM RB-2 1/2 8711H

## (undated) DEVICE — TBG ARTHRO FLOWSTEADY/ST DISP

## (undated) DEVICE — STRIP,CLOSURE,WOUND,MEDI-STRIP,1/2X4: Brand: MEDLINE

## (undated) DEVICE — DRSNG SURESITE WNDW 4X4.5

## (undated) DEVICE — GLOVE SURG SZ 75 L12IN FNGR THK94MIL TRNSLUC YEL LTX

## (undated) DEVICE — 4-PORT MANIFOLD: Brand: NEPTUNE 2

## (undated) DEVICE — GLV SURG DERMASSURE GRN LF PF 8.5

## (undated) DEVICE — [TOMCAT CUTTER, ARTHROSCOPIC SHAVER BLADE,  DO NOT RESTERILIZE,  DO NOT USE IF PACKAGE IS DAMAGED,  KEEP DRY,  KEEP AWAY FROM SUNLIGHT]: Brand: FORMULA

## (undated) DEVICE — PACK,SHOULDER,DRAPE,POUCH: Brand: MEDLINE

## (undated) DEVICE — CVR BRD ARM 13X30

## (undated) DEVICE — TRAP FLD MINIVAC MEGADYNE 100ML

## (undated) DEVICE — PAD,EYE,1-5/8X2 5/8,STERILE,LF,1/PK: Brand: MEDLINE

## (undated) DEVICE — CANN S/RET GEMINI SR8 STRL

## (undated) DEVICE — CANN TWST IN W/NOSQUIRT CAP 7IDX7CM

## (undated) DEVICE — ADHS LIQ MASTISOL 2/3ML

## (undated) DEVICE — OCCLUSIVE GAUZE STRIP,3% BISMUTH TRIBROMOPHENATE IN PETROLATUM BLEND: Brand: XEROFORM

## (undated) DEVICE — SHEET,DRAPE,53X77,STERILE: Brand: MEDLINE

## (undated) DEVICE — SURGICAL PROCEDURE PACK LOWER EXTREMITY LOURDES HOSP

## (undated) DEVICE — PK SHLDR 30

## (undated) DEVICE — GLV SURG SENSICARE PI ORTHO SZ8.5 LF STRL

## (undated) DEVICE — DRAPE,TOWEL,LARGE,INVISISHIELD: Brand: MEDLINE